# Patient Record
Sex: FEMALE | Race: OTHER | NOT HISPANIC OR LATINO
[De-identification: names, ages, dates, MRNs, and addresses within clinical notes are randomized per-mention and may not be internally consistent; named-entity substitution may affect disease eponyms.]

---

## 2019-10-07 ENCOUNTER — APPOINTMENT (OUTPATIENT)
Dept: ENDOCRINOLOGY | Facility: CLINIC | Age: 24
End: 2019-10-07
Payer: COMMERCIAL

## 2019-10-07 VITALS
WEIGHT: 125 LBS | SYSTOLIC BLOOD PRESSURE: 110 MMHG | BODY MASS INDEX: 24.54 KG/M2 | HEIGHT: 60 IN | HEART RATE: 45 BPM | DIASTOLIC BLOOD PRESSURE: 60 MMHG

## 2019-10-07 PROCEDURE — 99205 OFFICE O/P NEW HI 60 MIN: CPT | Mod: 25

## 2019-10-07 PROCEDURE — 82962 GLUCOSE BLOOD TEST: CPT | Mod: NC

## 2019-10-07 NOTE — PHYSICAL EXAM
[Well Nourished] : well nourished [EOMI] : extra ocular movement intact [Well Developed] : well developed [No Proptosis] : no proptosis [Thyroid Not Enlarged] : the thyroid was not enlarged [No Thyroid Nodules] : there were no palpable thyroid nodules [Clear to Auscultation] : lungs were clear to auscultation bilaterally [No Accessory Muscle Use] : no accessory muscle use [Normal S1, S2] : normal S1 and S2 [No Edema] : there was no peripheral edema [Regular Rhythm] : with a regular rhythm [No Clubbing, Cyanosis] : no clubbing  or cyanosis of the fingernails [Normal Strength/Tone] : muscle strength and tone were normal [No Stigmata of Cushings Syndrome] : no stigmata of cushings syndrome [No Motor Deficits] : the motor exam was normal [No Tremors] : no tremors [Normal Mood] : the mood was normal [Normal Affect] : the affect was normal [de-identified] : warm and moist

## 2019-10-07 NOTE — REVIEW OF SYSTEMS
[Muscle Cramps] : muscle cramps [Dry Skin] : dry skin [All other systems negative] : All other systems negative

## 2019-10-07 NOTE — HISTORY OF PRESENT ILLNESS
[FreeTextEntry1] : DM type:1\par Severity:uncontrolled\par Duration:3 months\par Onset:polyuria, polydipsia, weight loss, severe hyperglycemia requiring ER visit\par \par Associated symptoms or complications:variable glucose levels with hypoglycemia and hyperglycemia\par \par Modifying Factors:using dexcom G6\par \par Current regimen:\par toujeo 10 humalog colleen. 1:15 ICR, 1:100 CF. \par \par \par Current control:persistent hyperglycemia. Likely missing some meal boluses\par \par \par \par PMH:congenital heart block\par \par family history;\par mother SLE, autoimmune hepatitis

## 2019-10-07 NOTE — ASSESSMENT
[FreeTextEntry1] : DM type 1, uncontrolled. SUspect some erratic insulin use and failure to match humalog with food accurately. needs additional education and follow up, as well as review of dexcom download. Discussed option of pump therapy, which pt. is reluctant to consider but may be a future option.\par pending dexcom review continue current therapy for now. Ordered labs including islet cell antibodies and celiac panel.

## 2019-10-08 LAB — GLUCOSE BLDC GLUCOMTR-MCNC: 244

## 2019-10-31 ENCOUNTER — APPOINTMENT (OUTPATIENT)
Dept: ENDOCRINOLOGY | Facility: CLINIC | Age: 24
End: 2019-10-31

## 2019-11-13 ENCOUNTER — RX RENEWAL (OUTPATIENT)
Age: 24
End: 2019-11-13

## 2019-12-17 ENCOUNTER — APPOINTMENT (OUTPATIENT)
Dept: ENDOCRINOLOGY | Facility: CLINIC | Age: 24
End: 2019-12-17
Payer: COMMERCIAL

## 2019-12-17 ENCOUNTER — EMERGENCY (EMERGENCY)
Facility: HOSPITAL | Age: 24
LOS: 1 days | Discharge: HOME | End: 2019-12-17
Attending: EMERGENCY MEDICINE
Payer: COMMERCIAL

## 2019-12-17 VITALS
HEART RATE: 106 BPM | SYSTOLIC BLOOD PRESSURE: 121 MMHG | DIASTOLIC BLOOD PRESSURE: 86 MMHG | OXYGEN SATURATION: 99 % | RESPIRATION RATE: 20 BRPM

## 2019-12-17 VITALS
HEART RATE: 113 BPM | TEMPERATURE: 98 F | DIASTOLIC BLOOD PRESSURE: 68 MMHG | RESPIRATION RATE: 20 BRPM | SYSTOLIC BLOOD PRESSURE: 126 MMHG | OXYGEN SATURATION: 96 %

## 2019-12-17 VITALS — SYSTOLIC BLOOD PRESSURE: 158 MMHG | DIASTOLIC BLOOD PRESSURE: 92 MMHG | HEIGHT: 60 IN | HEART RATE: 132 BPM

## 2019-12-17 LAB
ACETONE SERPL-MCNC: NEGATIVE — SIGNIFICANT CHANGE UP
ALBUMIN SERPL ELPH-MCNC: 4.8 G/DL — SIGNIFICANT CHANGE UP (ref 3.3–5.2)
ALP SERPL-CCNC: 72 U/L — SIGNIFICANT CHANGE UP (ref 40–120)
ALT FLD-CCNC: 21 U/L — SIGNIFICANT CHANGE UP
ANION GAP SERPL CALC-SCNC: 18 MMOL/L — HIGH (ref 5–17)
AST SERPL-CCNC: 32 U/L — HIGH
BASOPHILS # BLD AUTO: 0.25 K/UL — HIGH (ref 0–0.2)
BASOPHILS NFR BLD AUTO: 2.6 % — HIGH (ref 0–2)
BILIRUB SERPL-MCNC: 0.2 MG/DL — LOW (ref 0.4–2)
BUN SERPL-MCNC: 8 MG/DL — SIGNIFICANT CHANGE UP (ref 8–20)
CALCIUM SERPL-MCNC: 9.3 MG/DL — SIGNIFICANT CHANGE UP (ref 8.6–10.2)
CHLORIDE SERPL-SCNC: 98 MMOL/L — SIGNIFICANT CHANGE UP (ref 98–107)
CO2 SERPL-SCNC: 24 MMOL/L — SIGNIFICANT CHANGE UP (ref 22–29)
CREAT SERPL-MCNC: 0.63 MG/DL — SIGNIFICANT CHANGE UP (ref 0.5–1.3)
EOSINOPHIL # BLD AUTO: 0.09 K/UL — SIGNIFICANT CHANGE UP (ref 0–0.5)
EOSINOPHIL NFR BLD AUTO: 0.9 % — SIGNIFICANT CHANGE UP (ref 0–6)
GLUCOSE BLDC GLUCOMTR-MCNC: 276
GLUCOSE SERPL-MCNC: 357 MG/DL — HIGH (ref 70–115)
HCG SERPL-ACNC: <4 MIU/ML — SIGNIFICANT CHANGE UP
HCT VFR BLD CALC: 44.8 % — SIGNIFICANT CHANGE UP (ref 34.5–45)
HGB BLD-MCNC: 15.7 G/DL — HIGH (ref 11.5–15.5)
LYMPHOCYTES # BLD AUTO: 4.64 K/UL — HIGH (ref 1–3.3)
LYMPHOCYTES # BLD AUTO: 47.8 % — HIGH (ref 13–44)
MANUAL SMEAR VERIFICATION: SIGNIFICANT CHANGE UP
MCHC RBC-ENTMCNC: 32.6 PG — SIGNIFICANT CHANGE UP (ref 27–34)
MCHC RBC-ENTMCNC: 35 GM/DL — SIGNIFICANT CHANGE UP (ref 32–36)
MCV RBC AUTO: 92.9 FL — SIGNIFICANT CHANGE UP (ref 80–100)
MONOCYTES # BLD AUTO: 0.34 K/UL — SIGNIFICANT CHANGE UP (ref 0–0.9)
MONOCYTES NFR BLD AUTO: 3.5 % — SIGNIFICANT CHANGE UP (ref 2–14)
NEUTROPHILS # BLD AUTO: 2.87 K/UL — SIGNIFICANT CHANGE UP (ref 1.8–7.4)
NEUTROPHILS NFR BLD AUTO: 29.6 % — LOW (ref 43–77)
PLAT MORPH BLD: NORMAL — SIGNIFICANT CHANGE UP
PLATELET # BLD AUTO: 420 K/UL — HIGH (ref 150–400)
POTASSIUM SERPL-MCNC: 4.2 MMOL/L — SIGNIFICANT CHANGE UP (ref 3.5–5.3)
POTASSIUM SERPL-SCNC: 4.2 MMOL/L — SIGNIFICANT CHANGE UP (ref 3.5–5.3)
PROT SERPL-MCNC: 7.8 G/DL — SIGNIFICANT CHANGE UP (ref 6.6–8.7)
RBC # BLD: 4.82 M/UL — SIGNIFICANT CHANGE UP (ref 3.8–5.2)
RBC # FLD: 11.5 % — SIGNIFICANT CHANGE UP (ref 10.3–14.5)
RBC BLD AUTO: NORMAL — SIGNIFICANT CHANGE UP
SODIUM SERPL-SCNC: 140 MMOL/L — SIGNIFICANT CHANGE UP (ref 135–145)
VARIANT LYMPHS # BLD: 15.6 % — HIGH (ref 0–6)
WBC # BLD: 9.71 K/UL — SIGNIFICANT CHANGE UP (ref 3.8–10.5)
WBC # FLD AUTO: 9.71 K/UL — SIGNIFICANT CHANGE UP (ref 3.8–10.5)

## 2019-12-17 PROCEDURE — 84702 CHORIONIC GONADOTROPIN TEST: CPT

## 2019-12-17 PROCEDURE — 80053 COMPREHEN METABOLIC PANEL: CPT

## 2019-12-17 PROCEDURE — 36415 COLL VENOUS BLD VENIPUNCTURE: CPT

## 2019-12-17 PROCEDURE — 99213 OFFICE O/P EST LOW 20 MIN: CPT | Mod: 25

## 2019-12-17 PROCEDURE — 82962 GLUCOSE BLOOD TEST: CPT | Mod: NC

## 2019-12-17 PROCEDURE — 82962 GLUCOSE BLOOD TEST: CPT

## 2019-12-17 PROCEDURE — 99284 EMERGENCY DEPT VISIT MOD MDM: CPT

## 2019-12-17 PROCEDURE — 99284 EMERGENCY DEPT VISIT MOD MDM: CPT | Mod: 25

## 2019-12-17 PROCEDURE — 85027 COMPLETE CBC AUTOMATED: CPT

## 2019-12-17 PROCEDURE — 82009 KETONE BODYS QUAL: CPT

## 2019-12-17 RX ORDER — SODIUM CHLORIDE 9 MG/ML
1000 INJECTION INTRAMUSCULAR; INTRAVENOUS; SUBCUTANEOUS ONCE
Refills: 0 | Status: COMPLETED | OUTPATIENT
Start: 2019-12-17 | End: 2019-12-17

## 2019-12-17 RX ORDER — ONDANSETRON 8 MG/1
4 TABLET, FILM COATED ORAL ONCE
Refills: 0 | Status: COMPLETED | OUTPATIENT
Start: 2019-12-17 | End: 2019-12-17

## 2019-12-17 RX ORDER — INSULIN HUMAN 100 [IU]/ML
4 INJECTION, SOLUTION SUBCUTANEOUS ONCE
Refills: 0 | Status: COMPLETED | OUTPATIENT
Start: 2019-12-17 | End: 2019-12-17

## 2019-12-17 RX ORDER — INSULIN HUMAN 100 [IU]/ML
3 INJECTION, SOLUTION SUBCUTANEOUS ONCE
Refills: 0 | Status: DISCONTINUED | OUTPATIENT
Start: 2019-12-17 | End: 2019-12-28

## 2019-12-17 RX ORDER — INSULIN GLARGINE 100 [IU]/ML
10 INJECTION, SOLUTION SUBCUTANEOUS
Qty: 300 | Refills: 0
Start: 2019-12-17 | End: 2020-01-15

## 2019-12-17 RX ADMIN — SODIUM CHLORIDE 1000 MILLILITER(S): 9 INJECTION INTRAMUSCULAR; INTRAVENOUS; SUBCUTANEOUS at 18:26

## 2019-12-17 NOTE — ASSESSMENT
[FreeTextEntry1] : 25 y/o with Type 1 DM.\par \par EMS called to bring pt. to [facility] due to tachycardia and hypertensive. Report given to EMS. \par Dr. Coates on-call provider made aware of current situation.  Pt. is to follow up s/p hospitalization.

## 2019-12-17 NOTE — ED ADULT NURSE NOTE - PMH
Alcohol abuse    Type 2 diabetes mellitus without complication, with long-term current use of insulin

## 2019-12-17 NOTE — HISTORY OF PRESENT ILLNESS
[FreeTextEntry1] : Type 1 DM: Per pt. she was recently released from a hospital in [place] r/t alcoholism. \par \par Pt. came into the office asking for a refill of Toujeo and stated that she has not been taking her insulin for 3 days due to no refills available. She stated that she has been drinking alcohol today and the last time she drank was 30 minutes ago. Denies drug use.  \par

## 2019-12-17 NOTE — PHYSICAL EXAM
[Alert] : alert [Oriented x3] : oriented to person, place, and time [de-identified] : red eyes  [de-identified] : tachycardia  [de-identified] : unsteady gait  [de-identified] : impaired insight

## 2019-12-17 NOTE — ED PROVIDER NOTE - OBJECTIVE STATEMENT
Pt. present to ED stating "I have not taken insulin in 2days". Pt. is out of her Insulin.  Pt. c/o nausea today. NO vomiting. Pt. with polydipsia and polyuria. Pt. has been on Insulin for the past 6 months. Pt. admits to drinking alcohol. Pt. admits to also smoking weed. NO chest pain. No fever. NO abdominal pain.

## 2019-12-17 NOTE — ED ADULT NURSE REASSESSMENT NOTE - NS ED NURSE REASSESS COMMENT FT1
Pt received at 1959. Chart as noted. Pt is A&OX3. Pt denies pain, N/V/D. Pt VSS, NSR on cardiac monitor. Pt in NAD at this time. Pt moves all extremities equal and bilateral. Plan of care and wait time explained. Pt awaiting tolerating PO intake and sister arrival.  Safety maintained.

## 2019-12-17 NOTE — ED ADULT NURSE REASSESSMENT NOTE - NS ED NURSE REASSESS COMMENT FT1
pt reports drinking daily " for a few years"  pt reports last drink 3pm today - " I drink like 2 pints a day"  pt states she "was in [place] and I got arrested then I was at the hospital but when I left they took my diabetes medication and didn't give it back when I left"

## 2019-12-17 NOTE — ED PROVIDER NOTE - PATIENT PORTAL LINK FT
You can access the FollowMyHealth Patient Portal offered by [facility] by registering at the following website: [email or web address]. By joining [facility]’s FollowMyHealth portal, you will also be able to view your health information using other applications (apps) compatible with our system.

## 2019-12-17 NOTE — ED ADULT NURSE NOTE - NSIMPLEMENTINTERV_GEN_ALL_ED
Implemented All Fall with Harm Risk Interventions:  [place] to call system. Call bell, personal items and telephone within reach. Instruct patient to call for assistance. Room bathroom lighting operational. Non-slip footwear when patient is off stretcher. Physically safe environment: no spills, clutter or unnecessary equipment. Stretcher in lowest position, wheels locked, appropriate side rails in place. Provide visual cue, wrist band, yellow gown, etc. Monitor gait and stability. Monitor for mental status changes and reorient to person, place, and time. Review medications for side effects contributing to fall risk. Reinforce activity limits and safety measures with patient and family. Provide visual clues: red socks.

## 2019-12-17 NOTE — ED PROVIDER NOTE - PROGRESS NOTE DETAILS
Pt. pulled out IV. Pt. refused the zofran. Serum Acetone is negative. Pt. not in DKA.  Pt. in no distress. Pt. lying in stretcher comfortably. Will ask pt. to contact family member for . Pt. now states that she take Humalog with meals and Toujeo as her long acting Insulin. Pt. has humalog but not her Toujeo. Pt. with improving vital signs. Pt. awake and alert. Normal and steady gait. prescription sent to patient's pharmacy. Pt's sister here to pick her up. Pt. tolerating PO. NO vomiting. No abdominal pain. Pt. stable for discharge.

## 2019-12-18 RX ORDER — INSULIN GLARGINE 100 [IU]/ML
10 INJECTION, SOLUTION SUBCUTANEOUS
Qty: 1 | Refills: 0
Start: 2019-12-18 | End: 2020-01-16

## 2020-03-10 ENCOUNTER — APPOINTMENT (OUTPATIENT)
Dept: ENDOCRINOLOGY | Facility: CLINIC | Age: 25
End: 2020-03-10

## 2020-03-10 PROBLEM — F10.10 ALCOHOL ABUSE, UNCOMPLICATED: Chronic | Status: ACTIVE | Noted: 2019-12-17

## 2020-03-10 PROBLEM — E11.9 TYPE 2 DIABETES MELLITUS WITHOUT COMPLICATIONS: Chronic | Status: ACTIVE | Noted: 2019-12-17

## 2020-05-22 ENCOUNTER — RX RENEWAL (OUTPATIENT)
Age: 25
End: 2020-05-22

## 2020-06-23 RX ORDER — FLASH GLUCOSE SENSOR
KIT MISCELLANEOUS
Qty: 1 | Refills: 0 | Status: ACTIVE | COMMUNITY
Start: 2019-11-13 | End: 1900-01-01

## 2020-06-23 RX ORDER — FLASH GLUCOSE SENSOR
KIT MISCELLANEOUS
Qty: 1 | Refills: 0 | Status: ACTIVE | COMMUNITY
Start: 2020-06-22 | End: 1900-01-01

## 2020-08-21 ENCOUNTER — APPOINTMENT (OUTPATIENT)
Dept: ENDOCRINOLOGY | Facility: CLINIC | Age: 25
End: 2020-08-21
Payer: COMMERCIAL

## 2020-08-21 VITALS
HEART RATE: 117 BPM | HEIGHT: 61 IN | DIASTOLIC BLOOD PRESSURE: 88 MMHG | SYSTOLIC BLOOD PRESSURE: 122 MMHG | OXYGEN SATURATION: 96 % | WEIGHT: 145 LBS | BODY MASS INDEX: 27.38 KG/M2

## 2020-08-21 PROCEDURE — 99214 OFFICE O/P EST MOD 30 MIN: CPT | Mod: 25

## 2020-08-21 PROCEDURE — 82962 GLUCOSE BLOOD TEST: CPT

## 2020-08-21 NOTE — REVIEW OF SYSTEMS
[Recent Weight Gain (___ Lbs)] : recent weight gain: [unfilled] lbs [Blurred Vision] : blurred vision [Fatigue] : no fatigue [Decreased Appetite] : appetite not decreased [Visual Field Defect] : no visual field defect [Neck Pain] : no neck pain [Dysphagia] : no dysphagia [Dysphonia] : no dysphonia [Palpitations] : no palpitations [Chest Pain] : no chest pain [Diarrhea] : no diarrhea [Constipation] : no constipation [Dysuria] : no dysuria [Polyuria] : no polyuria [Tremors] : no tremors [Headaches] : no headaches [Depression] : no depression [Polydipsia] : no polydipsia [Anxiety] : no anxiety [Cold Intolerance] : no cold intolerance [Heat Intolerance] : no heat intolerance [Swelling] : no swelling [FreeTextEntry3] : with high blood sugar

## 2020-08-21 NOTE — ASSESSMENT
[FreeTextEntry1] : 24 y/o female with uncontrolled Type 1 DM. \par \par - check A1C now\par - placed phuong personal sensor in office \par - start using Phuong reader again \par - continue current Rx\par - educated on the importance of annual retinopathy screening - information provided\par - schedule appointment with CDE in 2 weeks for carb counting\par - continue to follow up at [facility] for alcoholism treatment  \par - follow up visit in 6 weeks \par  [Retinopathy Screening] : Patient was referred to ophthalmology for retinopathy screening

## 2020-08-21 NOTE — PHYSICAL EXAM
[Alert] : alert [Well Developed] : well developed [No Acute Distress] : no acute distress [Well Nourished] : well nourished [No LAD] : no lymphadenopathy [EOMI] : extra ocular movement intact [Normal Sclera/Conjunctiva] : normal sclera/conjunctiva [Thyroid Not Enlarged] : the thyroid was not enlarged [No Thyroid Nodules] : no palpable thyroid nodules [No Respiratory Distress] : no respiratory distress [Normal Rate and Effort] : normal respiratory rate and effort [Normal S1, S2] : normal S1 and S2 [Clear to Auscultation] : lungs were clear to auscultation bilaterally [Regular Rhythm] : with a regular rhythm [No Edema] : no peripheral edema [Normal Rate] : heart rate was normal [Pedal Pulses Normal] : the pedal pulses are present [Normal Bowel Sounds] : normal bowel sounds [Soft] : abdomen soft [Not Tender] : non-tender [Acanthosis Nigricans] : no acanthosis nigricans [No Rash] : no rash [Foot Ulcers] : no foot ulcers [Right Foot Was Examined] : right foot ~C was examined [Left Foot Was Examined] : left foot ~C was examined [Normal] : normal [Oriented x3] : oriented to person, place, and time [No Tremors] : no tremors [Normal Mood] : the mood was normal [Normal Insight/Judgement] : insight and judgment were intact

## 2020-08-21 NOTE — HISTORY OF PRESENT ILLNESS
[FreeTextEntry1] : Pt. reports that she is in recovery for alcoholism. She attends [employer]. Currently she has 2 alcoholic drinks a day. \par \par Quality: Type 1 DM \par Severity: uncontrolled \par Duration: over 1 year ago\par Onset: weight loss, BG > 500 \par Modifying Factors: Better with insulin \par Associated Symptoms: denies micro/macrovascular \par \par Current Regimen: Pt. is fearful of having low blood sugars. \par Humalog 3 units before meals\par Toujeo 10 units at am \par \par Self blood sugar monitoring: rarely. She was using the Phuong in the past. \par Current BG  291\par \par exercise: none\par \par Diet: 2 alcohol drinks a day\par - fried foods, salads \par \par \par Date of last eye exam: years ago \par Date of last foot exam: none \par Date of last flu vaccine: no \par Date of last Pneumovax: no\par

## 2020-08-26 LAB
25(OH)D3 SERPL-MCNC: 33.1 NG/ML
ALBUMIN SERPL ELPH-MCNC: 4.6 G/DL
ALP BLD-CCNC: 72 U/L
ALT SERPL-CCNC: 101 U/L
ANION GAP SERPL CALC-SCNC: 19 MMOL/L
AST SERPL-CCNC: 98 U/L
BASOPHILS # BLD AUTO: 0.11 K/UL
BASOPHILS NFR BLD AUTO: 1.6 %
BILIRUB SERPL-MCNC: 0.2 MG/DL
BUN SERPL-MCNC: 11 MG/DL
CALCIUM SERPL-MCNC: 9.3 MG/DL
CHLORIDE SERPL-SCNC: 97 MMOL/L
CHOLEST SERPL-MCNC: 186 MG/DL
CHOLEST/HDLC SERPL: 2.5 RATIO
CO2 SERPL-SCNC: 24 MMOL/L
CREAT SERPL-MCNC: 0.55 MG/DL
CREAT SPEC-SCNC: 81 MG/DL
EOSINOPHIL # BLD AUTO: 0.05 K/UL
EOSINOPHIL NFR BLD AUTO: 0.7 %
ESTIMATED AVERAGE GLUCOSE: 214 MG/DL
GLUCOSE SERPL-MCNC: 299 MG/DL
HBA1C MFR BLD HPLC: 9.1 %
HCT VFR BLD CALC: 42.8 %
HDLC SERPL-MCNC: 74 MG/DL
HGB BLD-MCNC: 14.6 G/DL
IMM GRANULOCYTES NFR BLD AUTO: 0.3 %
LDLC SERPL CALC-MCNC: 54 MG/DL
LYMPHOCYTES # BLD AUTO: 2.11 K/UL
LYMPHOCYTES NFR BLD AUTO: 30.4 %
MAN DIFF?: NORMAL
MCHC RBC-ENTMCNC: 33.7 PG
MCHC RBC-ENTMCNC: 34.1 GM/DL
MCV RBC AUTO: 98.8 FL
MICROALBUMIN 24H UR DL<=1MG/L-MCNC: <1.2 MG/DL
MICROALBUMIN/CREAT 24H UR-RTO: NORMAL MG/G
MONOCYTES # BLD AUTO: 0.56 K/UL
MONOCYTES NFR BLD AUTO: 8.1 %
NEUTROPHILS # BLD AUTO: 4.1 K/UL
NEUTROPHILS NFR BLD AUTO: 58.9 %
PLATELET # BLD AUTO: 305 K/UL
POTASSIUM SERPL-SCNC: 4.2 MMOL/L
PROT SERPL-MCNC: 6.7 G/DL
RBC # BLD: 4.33 M/UL
RBC # FLD: 11.6 %
SODIUM SERPL-SCNC: 139 MMOL/L
T4 FREE SERPL-MCNC: 1.1 NG/DL
TRIGL SERPL-MCNC: 289 MG/DL
TSH SERPL-ACNC: 2.17 UIU/ML
VIT B12 SERPL-MCNC: 821 PG/ML
WBC # FLD AUTO: 6.95 K/UL

## 2020-10-23 ENCOUNTER — APPOINTMENT (OUTPATIENT)
Dept: ENDOCRINOLOGY | Facility: CLINIC | Age: 25
End: 2020-10-23

## 2021-01-24 ENCOUNTER — RX RENEWAL (OUTPATIENT)
Age: 26
End: 2021-01-24

## 2021-02-19 ENCOUNTER — APPOINTMENT (OUTPATIENT)
Dept: ENDOCRINOLOGY | Facility: CLINIC | Age: 26
End: 2021-02-19
Payer: COMMERCIAL

## 2021-02-19 VITALS
TEMPERATURE: 97 F | DIASTOLIC BLOOD PRESSURE: 80 MMHG | WEIGHT: 145 LBS | OXYGEN SATURATION: 97 % | HEIGHT: 61 IN | SYSTOLIC BLOOD PRESSURE: 120 MMHG | BODY MASS INDEX: 27.38 KG/M2 | HEART RATE: 111 BPM

## 2021-02-19 LAB — GLUCOSE BLDC GLUCOMTR-MCNC: 255

## 2021-02-19 PROCEDURE — 82962 GLUCOSE BLOOD TEST: CPT

## 2021-02-19 PROCEDURE — 99072 ADDL SUPL MATRL&STAF TM PHE: CPT

## 2021-02-19 PROCEDURE — 95251 CONT GLUC MNTR ANALYSIS I&R: CPT

## 2021-02-19 PROCEDURE — 99214 OFFICE O/P EST MOD 30 MIN: CPT | Mod: 25

## 2021-02-19 NOTE — PHYSICAL EXAM
[Alert] : alert [Well Nourished] : well nourished [No Acute Distress] : no acute distress [Well Developed] : well developed [Normal Sclera/Conjunctiva] : normal sclera/conjunctiva [EOMI] : extra ocular movement intact [No LAD] : no lymphadenopathy [Thyroid Not Enlarged] : the thyroid was not enlarged [No Thyroid Nodules] : no palpable thyroid nodules [No Respiratory Distress] : no respiratory distress [Normal Rate and Effort] : normal respiratory rate and effort [Clear to Auscultation] : lungs were clear to auscultation bilaterally [Normal S1, S2] : normal S1 and S2 [Normal Rate] : heart rate was normal [Regular Rhythm] : with a regular rhythm [No Edema] : no peripheral edema [Pedal Pulses Normal] : the pedal pulses are present [Normal Bowel Sounds] : normal bowel sounds [Not Tender] : non-tender [Soft] : abdomen soft [No Rash] : no rash [Acanthosis Nigricans] : no acanthosis nigricans [Foot Ulcers] : no foot ulcers [Right Foot Was Examined] : right foot ~C was examined [Left Foot Was Examined] : left foot ~C was examined [Normal] : normal [Diminished Throughout Both Feet] : normal tactile sensation with monofilament testing throughout both feet [No Tremors] : no tremors [Oriented x3] : oriented to person, place, and time [Normal Affect] : the affect was normal [Normal Insight/Judgement] : insight and judgment were intact [Normal Mood] : the mood was normal

## 2021-02-19 NOTE — HISTORY OF PRESENT ILLNESS
[FreeTextEntry1] : Pt. reports that she is in recovery for alcoholism. She attends [facility] for substance abuse. Currently she has 1 alcoholic drinks a day. Pt. reports that use of daily marijuana use. \par \par Quality: Type 1 DM \par Severity: uncontrolled \par Duration: over 1 year ago\par Onset: weight loss, BG > 500 \par Modifying Factors: Better with insulin \par Associated Symptoms: denies micro/macrovascular \par \par Current Regimen: Pt. is fearful of having low blood sugars. \par Humalog 3-6 units before meals\par Toujeo 12 units at am \par \par Self blood sugar monitoring: Phuong Freyle senor results:  Average Glucose 289, 97% over 180, 3% in target range, 0% less than 70\par limited data to not scanning sensor consistently \par Current BG  255\par \par exercise: none\par \par Diet: 1 alcohol drink a day\par - fried foods, salads \par \par \par Date of last eye exam: years ago \par Date of last foot exam: none \par Date of last flu vaccine: no \par Date of last Pneumovax: no

## 2021-02-19 NOTE — REVIEW OF SYSTEMS
[Recent Weight Gain (___ Lbs)] : recent weight gain: [unfilled] lbs [Blurred Vision] : blurred vision [Neck Pain] : neck pain [Fatigue] : no fatigue [Decreased Appetite] : appetite not decreased [Visual Field Defect] : no visual field defect [Dysphagia] : no dysphagia [Chest Pain] : no chest pain [Dysphonia] : no dysphonia [Palpitations] : no palpitations [Constipation] : no constipation [Diarrhea] : no diarrhea [Polyuria] : no polyuria [Dysuria] : no dysuria [Headaches] : no headaches [Tremors] : no tremors [Depression] : no depression [Anxiety] : no anxiety [Polydipsia] : no polydipsia [Swelling] : no swelling [de-identified] : dry mouth

## 2021-02-24 LAB
25(OH)D3 SERPL-MCNC: 16.5 NG/ML
ALBUMIN SERPL ELPH-MCNC: 4.8 G/DL
ALP BLD-CCNC: 65 U/L
ALT SERPL-CCNC: 130 U/L
ANION GAP SERPL CALC-SCNC: 21 MMOL/L
AST SERPL-CCNC: 168 U/L
BASOPHILS # BLD AUTO: 0.15 K/UL
BASOPHILS NFR BLD AUTO: 1.8 %
BILIRUB SERPL-MCNC: 0.3 MG/DL
BUN SERPL-MCNC: 9 MG/DL
CALCIUM SERPL-MCNC: 9.7 MG/DL
CHLORIDE SERPL-SCNC: 93 MMOL/L
CHOLEST SERPL-MCNC: 245 MG/DL
CO2 SERPL-SCNC: 22 MMOL/L
CREAT SERPL-MCNC: 0.54 MG/DL
CREAT SPEC-SCNC: 7 MG/DL
EOSINOPHIL # BLD AUTO: 0.06 K/UL
EOSINOPHIL NFR BLD AUTO: 0.7 %
ESTIMATED AVERAGE GLUCOSE: 249 MG/DL
GLUCOSE SERPL-MCNC: 238 MG/DL
HBA1C MFR BLD HPLC: 10.3 %
HCT VFR BLD CALC: 45.6 %
HDLC SERPL-MCNC: 99 MG/DL
HGB BLD-MCNC: 15.7 G/DL
IMM GRANULOCYTES NFR BLD AUTO: 0.4 %
LDLC SERPL CALC-MCNC: 126 MG/DL
LYMPHOCYTES # BLD AUTO: 2.42 K/UL
LYMPHOCYTES NFR BLD AUTO: 29 %
MAN DIFF?: NORMAL
MCHC RBC-ENTMCNC: 34.4 GM/DL
MCHC RBC-ENTMCNC: 34.4 PG
MCV RBC AUTO: 99.8 FL
MICROALBUMIN 24H UR DL<=1MG/L-MCNC: <1.2 MG/DL
MICROALBUMIN/CREAT 24H UR-RTO: NORMAL MG/G
MONOCYTES # BLD AUTO: 0.64 K/UL
MONOCYTES NFR BLD AUTO: 7.7 %
NEUTROPHILS # BLD AUTO: 5.04 K/UL
NEUTROPHILS NFR BLD AUTO: 60.4 %
NONHDLC SERPL-MCNC: 146 MG/DL
PLATELET # BLD AUTO: 300 K/UL
POTASSIUM SERPL-SCNC: 4.6 MMOL/L
PROT SERPL-MCNC: 7.6 G/DL
RBC # BLD: 4.57 M/UL
RBC # FLD: 11.1 %
SODIUM SERPL-SCNC: 135 MMOL/L
T4 FREE SERPL-MCNC: 1.1 NG/DL
TRIGL SERPL-MCNC: 98 MG/DL
TSH SERPL-ACNC: 1.1 UIU/ML
VIT B12 SERPL-MCNC: 945 PG/ML
WBC # FLD AUTO: 8.34 K/UL

## 2021-02-24 RX ORDER — ERGOCALCIFEROL 1.25 MG/1
1.25 MG CAPSULE, LIQUID FILLED ORAL
Qty: 4 | Refills: 5 | Status: ACTIVE | COMMUNITY
Start: 2021-02-23 | End: 1900-01-01

## 2021-03-11 ENCOUNTER — APPOINTMENT (OUTPATIENT)
Dept: ENDOCRINOLOGY | Facility: CLINIC | Age: 26
End: 2021-03-11

## 2021-04-01 ENCOUNTER — APPOINTMENT (OUTPATIENT)
Dept: ENDOCRINOLOGY | Facility: CLINIC | Age: 26
End: 2021-04-01

## 2021-04-20 ENCOUNTER — APPOINTMENT (OUTPATIENT)
Dept: ENDOCRINOLOGY | Facility: CLINIC | Age: 26
End: 2021-04-20
Payer: COMMERCIAL

## 2021-04-20 PROCEDURE — 99072 ADDL SUPL MATRL&STAF TM PHE: CPT

## 2021-04-20 PROCEDURE — 99214 OFFICE O/P EST MOD 30 MIN: CPT | Mod: 95

## 2021-04-20 PROCEDURE — G0108 DIAB MANAGE TRN  PER INDIV: CPT

## 2021-04-20 RX ORDER — FLASH GLUCOSE SENSOR
KIT MISCELLANEOUS
Qty: 6 | Refills: 3 | Status: ACTIVE | COMMUNITY
Start: 2020-06-24 | End: 1900-01-01

## 2021-04-20 NOTE — HISTORY OF PRESENT ILLNESS
[Home] : at home, [unfilled] , at the time of the visit. [Other Location: e.g. Home (Enter Location, City,State)___] : at [unfilled] [Verbal consent obtained from patient] : the patient, [unfilled] [FreeTextEntry1] : Pt. reports that she is in recovery for alcoholism. She attends [facility] for substance abuse. Currently she has 1 alcoholic drinks a day. Pt. reports that use of daily marijuana use. \par \par Interval History: Recently diagnosis celiac disease, had endoscopy with biopsy of intestine, no results as of yet. \par \par Quality: Type 1 DM \par Severity: uncontrolled \par Duration: over 1 year ago\par Onset: weight loss, BG > 500 \par Modifying Factors: Better with insulin \par Associated Symptoms: denies micro/macrovascular \par \par Current Regimen: Pt. is fearful of having low blood sugars. \par Humalog 5 units before meals\par Toujeo 14 units at am \par \par Self blood sugar monitoring: Phuong haskins results:  per patient Average Glucose 135 \par limited data to not scanning sensor consistently \par scanned \par recent blood sugar of 80 corrected with pure suagr \par \par exercise: walking \par \par Diet: 1 alcohol drink a day\par - changing to gluten free diet \par \par \par Date of last eye exam: years ago \par Date of last foot exam: none \par Date of last flu vaccine: no \par Date of last Pneumovax: no

## 2021-04-20 NOTE — REVIEW OF SYSTEMS
[Fatigue] : no fatigue [Decreased Appetite] : decreased appetite [Recent Weight Gain (___ Lbs)] : no recent weight gain [Recent Weight Loss (___ Lbs)] : recent weight loss: [unfilled] lbs [Visual Field Defect] : no visual field defect [Blurred Vision] : no blurred vision [Dysphagia] : no dysphagia [Neck Pain] : no neck pain [Dysphonia] : no dysphonia [Chest Pain] : no chest pain [Palpitations] : palpitations [Vomiting] : vomiting [Constipation] : no constipation [Diarrhea] : no diarrhea [Polyuria] : no polyuria [Dysuria] : no dysuria [Headaches] : no headaches [Tremors] : no tremors [Polydipsia] : no polydipsia [de-identified] : dry mouth

## 2021-04-20 NOTE — ASSESSMENT
[FreeTextEntry1] : 25 y/o female with uncontrolled Type 1 DM. \par \par Plan: \par - continue current Rx for now, until phuong is downloaded today \par - continue Phuong sensor\par - discussed the damaging effects to the body with high blood sugar \par - educated on the importance of annual retinopathy screening - information provided\par - keep scheduled appointment with CDE to review gluten free diet and download Phuong sensor \par - continue to follow up at [facility] for alcoholism treatment  \par - follow up with Cardiology due to palpitations and history of second degree heart block \par - Glucose Sensor Necessity: This patient with diabetes performs 4 or more glucose checks per day utilizing a continuous blood glucose monitor. The patient is treated with insulin via 3 or more injections daily. This patient requires frequent adjustments to their insulin treatment on the basis of therapeutic continuous glucose monitoring results.\par \par RTO in 4 weeks \par \par Start Time: 9:00\par End Time: 9:31

## 2021-04-22 ENCOUNTER — NON-APPOINTMENT (OUTPATIENT)
Age: 26
End: 2021-04-22

## 2021-04-22 DIAGNOSIS — E10.65 TYPE 1 DIABETES MELLITUS WITH HYPERGLYCEMIA: ICD-10-CM

## 2021-04-25 ENCOUNTER — RX RENEWAL (OUTPATIENT)
Age: 26
End: 2021-04-25

## 2021-05-03 ENCOUNTER — APPOINTMENT (OUTPATIENT)
Dept: CARDIOLOGY | Facility: CLINIC | Age: 26
End: 2021-05-03

## 2021-07-09 ENCOUNTER — RX RENEWAL (OUTPATIENT)
Age: 26
End: 2021-07-09

## 2021-07-30 NOTE — ED ADULT TRIAGE NOTE - NS ED NURSE AMBULANCES
Patient mother Tyra called requesting a right shoulder x ray.    She has a follow up appointment next Wednesday and is having a x ray for scoliosis  And would like her right shoulder x rayed since it has never been done for her shoulder weakness just to make sure nothing has been missed.    Please place order if you feel appropriate.    Onlly call mom back if further questions .   [facility]

## 2021-08-25 ENCOUNTER — APPOINTMENT (OUTPATIENT)
Dept: ENDOCRINOLOGY | Facility: CLINIC | Age: 26
End: 2021-08-25

## 2021-09-24 ENCOUNTER — RX RENEWAL (OUTPATIENT)
Age: 26
End: 2021-09-24

## 2021-10-29 ENCOUNTER — APPOINTMENT (OUTPATIENT)
Dept: ENDOCRINOLOGY | Facility: CLINIC | Age: 26
End: 2021-10-29

## 2021-12-22 ENCOUNTER — RX RENEWAL (OUTPATIENT)
Age: 26
End: 2021-12-22

## 2021-12-22 RX ORDER — PEN NEEDLE, DIABETIC 29 G X1/2"
32G X 4 MM NEEDLE, DISPOSABLE MISCELLANEOUS
Qty: 360 | Refills: 0 | Status: ACTIVE | COMMUNITY
Start: 2020-08-21 | End: 1900-01-01

## 2022-01-10 RX ORDER — FLASH GLUCOSE SCANNING READER
EACH MISCELLANEOUS
Qty: 1 | Refills: 0 | Status: ACTIVE | COMMUNITY
Start: 2022-01-10 | End: 1900-01-01

## 2022-01-28 RX ORDER — INSULIN LISPRO 100 [IU]/ML
100 INJECTION, SOLUTION SUBCUTANEOUS
Qty: 1 | Refills: 0 | Status: ACTIVE | COMMUNITY
Start: 2019-10-07 | End: 1900-01-01

## 2022-01-28 RX ORDER — INSULIN GLARGINE 300 U/ML
300 INJECTION, SOLUTION SUBCUTANEOUS
Qty: 1 | Refills: 0 | Status: ACTIVE | COMMUNITY
Start: 2019-10-07 | End: 1900-01-01

## 2022-02-15 NOTE — ED ADULT TRIAGE NOTE - PAIN: PRESENCE, MLM
Detail Level: Detailed Quality 226: Preventive Care And Screening: Tobacco Use: Screening And Cessation Intervention: Patient screened for tobacco use, is a smoker AND received Cessation Counseling Quality 130: Documentation Of Current Medications In The Medical Record: Current Medications Documented Quality 402: Tobacco Use And Help With Quitting Among Adolescents: Patient screened for tobacco and is a smoker AND received Cessation Counseling Quality 431: Preventive Care And Screening: Unhealthy Alcohol Use - Screening: Patient not identified as an unhealthy alcohol user when screened for unhealthy alcohol use using a systematic screening method denies pain/discomfort

## 2022-02-18 ENCOUNTER — NON-APPOINTMENT (OUTPATIENT)
Age: 27
End: 2022-02-18

## 2022-02-24 ENCOUNTER — APPOINTMENT (OUTPATIENT)
Dept: ENDOCRINOLOGY | Facility: CLINIC | Age: 27
End: 2022-02-24

## 2022-08-24 NOTE — ED ADULT TRIAGE NOTE - TEMPERATURE IN FAHRENHEIT (DEGREES F)
98.4 Zyclara Counseling:  I discussed with the patient the risks of imiquimod including but not limited to erythema, scaling, itching, weeping, crusting, and pain.  Patient understands that the inflammatory response to imiquimod is variable from person to person and was educated regarded proper titration schedule.  If flu-like symptoms develop, patient knows to discontinue the medication and contact us.

## 2022-11-01 NOTE — ED ADULT NURSE NOTE - CHPI ED NUR SYMPTOMS POS

## 2023-01-18 NOTE — ED ADULT NURSE NOTE - MODE OF DISCHARGE
Ambulatory Arava Counseling:  Patient counseled regarding adverse effects of Arava including but not limited to nausea, vomiting, abnormalities in liver function tests. Patients may develop mouth sores, rash, diarrhea, and abnormalities in blood counts. The patient understands that monitoring is required including LFTs and blood counts.  There is a rare possibility of scarring of the liver and lung problems that can occur when taking methotrexate. Persistent nausea, loss of appetite, pale stools, dark urine, cough, and shortness of breath should be reported immediately. Patient advised to discontinue Arava treatment and consult with a physician prior to attempting conception. The patient will have to undergo a treatment to eliminate Arava from the body prior to conception.

## 2023-12-20 ENCOUNTER — INPATIENT (INPATIENT)
Facility: HOSPITAL | Age: 28
LOS: 3 days | Discharge: HOME | DRG: 896 | End: 2023-12-24
Attending: HOSPITALIST | Admitting: STUDENT IN AN ORGANIZED HEALTH CARE EDUCATION/TRAINING PROGRAM
Payer: SELF-PAY

## 2023-12-20 VITALS
DIASTOLIC BLOOD PRESSURE: 82 MMHG | RESPIRATION RATE: 17 BRPM | WEIGHT: 139.99 LBS | OXYGEN SATURATION: 100 % | SYSTOLIC BLOOD PRESSURE: 119 MMHG | TEMPERATURE: 98 F | HEART RATE: 100 BPM

## 2023-12-20 LAB
ALBUMIN SERPL ELPH-MCNC: 2.8 G/DL — LOW (ref 3.3–5.2)
ALBUMIN SERPL ELPH-MCNC: 2.8 G/DL — LOW (ref 3.3–5.2)
ALP SERPL-CCNC: 124 U/L — HIGH (ref 40–120)
ALP SERPL-CCNC: 124 U/L — HIGH (ref 40–120)
ALT FLD-CCNC: SIGNIFICANT CHANGE UP U/L
ALT FLD-CCNC: SIGNIFICANT CHANGE UP U/L
ANION GAP SERPL CALC-SCNC: 14 MMOL/L — SIGNIFICANT CHANGE UP (ref 5–17)
ANION GAP SERPL CALC-SCNC: 14 MMOL/L — SIGNIFICANT CHANGE UP (ref 5–17)
APAP SERPL-MCNC: <3 UG/ML — LOW (ref 10–26)
APAP SERPL-MCNC: <3 UG/ML — LOW (ref 10–26)
AST SERPL-CCNC: SIGNIFICANT CHANGE UP U/L
AST SERPL-CCNC: SIGNIFICANT CHANGE UP U/L
BASE EXCESS BLDV CALC-SCNC: 6.6 MMOL/L — HIGH (ref -2–3)
BASE EXCESS BLDV CALC-SCNC: 6.6 MMOL/L — HIGH (ref -2–3)
BASOPHILS # BLD AUTO: 0.06 K/UL — SIGNIFICANT CHANGE UP (ref 0–0.2)
BASOPHILS # BLD AUTO: 0.06 K/UL — SIGNIFICANT CHANGE UP (ref 0–0.2)
BASOPHILS NFR BLD AUTO: 1.1 % — SIGNIFICANT CHANGE UP (ref 0–2)
BASOPHILS NFR BLD AUTO: 1.1 % — SIGNIFICANT CHANGE UP (ref 0–2)
BILIRUB SERPL-MCNC: 1.4 MG/DL — SIGNIFICANT CHANGE UP (ref 0.4–2)
BILIRUB SERPL-MCNC: 1.4 MG/DL — SIGNIFICANT CHANGE UP (ref 0.4–2)
BUN SERPL-MCNC: 15 MG/DL — SIGNIFICANT CHANGE UP (ref 8–20)
BUN SERPL-MCNC: 15 MG/DL — SIGNIFICANT CHANGE UP (ref 8–20)
CA-I SERPL-SCNC: 0.98 MMOL/L — LOW (ref 1.15–1.33)
CA-I SERPL-SCNC: 0.98 MMOL/L — LOW (ref 1.15–1.33)
CALCIUM SERPL-MCNC: 8.3 MG/DL — LOW (ref 8.4–10.5)
CALCIUM SERPL-MCNC: 8.3 MG/DL — LOW (ref 8.4–10.5)
CHLORIDE BLDV-SCNC: 92 MMOL/L — LOW (ref 96–108)
CHLORIDE BLDV-SCNC: 92 MMOL/L — LOW (ref 96–108)
CHLORIDE SERPL-SCNC: 78 MMOL/L — LOW (ref 96–108)
CHLORIDE SERPL-SCNC: 78 MMOL/L — LOW (ref 96–108)
CK MB CFR SERPL CALC: 2.7 NG/ML — SIGNIFICANT CHANGE UP (ref 0–6.7)
CK MB CFR SERPL CALC: 2.7 NG/ML — SIGNIFICANT CHANGE UP (ref 0–6.7)
CK SERPL-CCNC: 173 U/L — HIGH (ref 25–170)
CK SERPL-CCNC: 173 U/L — HIGH (ref 25–170)
CO2 SERPL-SCNC: 26 MMOL/L — SIGNIFICANT CHANGE UP (ref 22–29)
CO2 SERPL-SCNC: 26 MMOL/L — SIGNIFICANT CHANGE UP (ref 22–29)
CREAT SERPL-MCNC: 0.65 MG/DL — SIGNIFICANT CHANGE UP (ref 0.5–1.3)
CREAT SERPL-MCNC: 0.65 MG/DL — SIGNIFICANT CHANGE UP (ref 0.5–1.3)
EGFR: 124 ML/MIN/1.73M2 — SIGNIFICANT CHANGE UP
EGFR: 124 ML/MIN/1.73M2 — SIGNIFICANT CHANGE UP
EOSINOPHIL # BLD AUTO: 0.01 K/UL — SIGNIFICANT CHANGE UP (ref 0–0.5)
EOSINOPHIL # BLD AUTO: 0.01 K/UL — SIGNIFICANT CHANGE UP (ref 0–0.5)
EOSINOPHIL NFR BLD AUTO: 0.2 % — SIGNIFICANT CHANGE UP (ref 0–6)
EOSINOPHIL NFR BLD AUTO: 0.2 % — SIGNIFICANT CHANGE UP (ref 0–6)
ETHANOL SERPL-MCNC: <10 MG/DL — SIGNIFICANT CHANGE UP (ref 0–9)
ETHANOL SERPL-MCNC: <10 MG/DL — SIGNIFICANT CHANGE UP (ref 0–9)
GAS PNL BLDV: 125 MMOL/L — LOW (ref 136–145)
GAS PNL BLDV: 125 MMOL/L — LOW (ref 136–145)
GAS PNL BLDV: SIGNIFICANT CHANGE UP
GLUCOSE BLDV-MCNC: 265 MG/DL — HIGH (ref 70–99)
GLUCOSE BLDV-MCNC: 265 MG/DL — HIGH (ref 70–99)
GLUCOSE SERPL-MCNC: 233 MG/DL — HIGH (ref 70–99)
GLUCOSE SERPL-MCNC: 233 MG/DL — HIGH (ref 70–99)
HCG SERPL-ACNC: <4 MIU/ML — SIGNIFICANT CHANGE UP
HCG SERPL-ACNC: <4 MIU/ML — SIGNIFICANT CHANGE UP
HCO3 BLDV-SCNC: 31 MMOL/L — HIGH (ref 22–29)
HCO3 BLDV-SCNC: 31 MMOL/L — HIGH (ref 22–29)
HCT VFR BLD CALC: 36.9 % — SIGNIFICANT CHANGE UP (ref 34.5–45)
HCT VFR BLD CALC: 36.9 % — SIGNIFICANT CHANGE UP (ref 34.5–45)
HCT VFR BLDA CALC: 40 % — SIGNIFICANT CHANGE UP
HCT VFR BLDA CALC: 40 % — SIGNIFICANT CHANGE UP
HGB BLD-MCNC: 13.3 G/DL — SIGNIFICANT CHANGE UP (ref 11.5–15.5)
HGB BLD-MCNC: 13.3 G/DL — SIGNIFICANT CHANGE UP (ref 11.5–15.5)
IMM GRANULOCYTES NFR BLD AUTO: 0.4 % — SIGNIFICANT CHANGE UP (ref 0–0.9)
IMM GRANULOCYTES NFR BLD AUTO: 0.4 % — SIGNIFICANT CHANGE UP (ref 0–0.9)
LACTATE BLDV-MCNC: 4.7 MMOL/L — CRITICAL HIGH (ref 0.5–2)
LACTATE BLDV-MCNC: 4.7 MMOL/L — CRITICAL HIGH (ref 0.5–2)
LACTATE BLDV-MCNC: 5.2 MMOL/L — CRITICAL HIGH (ref 0.5–2)
LACTATE BLDV-MCNC: 5.2 MMOL/L — CRITICAL HIGH (ref 0.5–2)
LYMPHOCYTES # BLD AUTO: 2.55 K/UL — SIGNIFICANT CHANGE UP (ref 1–3.3)
LYMPHOCYTES # BLD AUTO: 2.55 K/UL — SIGNIFICANT CHANGE UP (ref 1–3.3)
LYMPHOCYTES # BLD AUTO: 45.1 % — HIGH (ref 13–44)
LYMPHOCYTES # BLD AUTO: 45.1 % — HIGH (ref 13–44)
MCHC RBC-ENTMCNC: 33.9 PG — SIGNIFICANT CHANGE UP (ref 27–34)
MCHC RBC-ENTMCNC: 33.9 PG — SIGNIFICANT CHANGE UP (ref 27–34)
MCHC RBC-ENTMCNC: 36 GM/DL — SIGNIFICANT CHANGE UP (ref 32–36)
MCHC RBC-ENTMCNC: 36 GM/DL — SIGNIFICANT CHANGE UP (ref 32–36)
MCV RBC AUTO: 94.1 FL — SIGNIFICANT CHANGE UP (ref 80–100)
MCV RBC AUTO: 94.1 FL — SIGNIFICANT CHANGE UP (ref 80–100)
MONOCYTES # BLD AUTO: 0.17 K/UL — SIGNIFICANT CHANGE UP (ref 0–0.9)
MONOCYTES # BLD AUTO: 0.17 K/UL — SIGNIFICANT CHANGE UP (ref 0–0.9)
MONOCYTES NFR BLD AUTO: 3 % — SIGNIFICANT CHANGE UP (ref 2–14)
MONOCYTES NFR BLD AUTO: 3 % — SIGNIFICANT CHANGE UP (ref 2–14)
NEUTROPHILS # BLD AUTO: 2.84 K/UL — SIGNIFICANT CHANGE UP (ref 1.8–7.4)
NEUTROPHILS # BLD AUTO: 2.84 K/UL — SIGNIFICANT CHANGE UP (ref 1.8–7.4)
NEUTROPHILS NFR BLD AUTO: 50.2 % — SIGNIFICANT CHANGE UP (ref 43–77)
NEUTROPHILS NFR BLD AUTO: 50.2 % — SIGNIFICANT CHANGE UP (ref 43–77)
PCO2 BLDV: 48 MMHG — HIGH (ref 39–42)
PCO2 BLDV: 48 MMHG — HIGH (ref 39–42)
PH BLDV: 7.42 — SIGNIFICANT CHANGE UP (ref 7.32–7.43)
PH BLDV: 7.42 — SIGNIFICANT CHANGE UP (ref 7.32–7.43)
PLATELET # BLD AUTO: 160 K/UL — SIGNIFICANT CHANGE UP (ref 150–400)
PLATELET # BLD AUTO: 160 K/UL — SIGNIFICANT CHANGE UP (ref 150–400)
PO2 BLDV: 49 MMHG — HIGH (ref 25–45)
PO2 BLDV: 49 MMHG — HIGH (ref 25–45)
POTASSIUM BLDV-SCNC: 6 MMOL/L — HIGH (ref 3.5–5.1)
POTASSIUM BLDV-SCNC: 6 MMOL/L — HIGH (ref 3.5–5.1)
POTASSIUM SERPL-MCNC: 4.5 MMOL/L — SIGNIFICANT CHANGE UP (ref 3.5–5.3)
POTASSIUM SERPL-MCNC: 4.5 MMOL/L — SIGNIFICANT CHANGE UP (ref 3.5–5.3)
POTASSIUM SERPL-SCNC: 4.5 MMOL/L — SIGNIFICANT CHANGE UP (ref 3.5–5.3)
POTASSIUM SERPL-SCNC: 4.5 MMOL/L — SIGNIFICANT CHANGE UP (ref 3.5–5.3)
PROT SERPL-MCNC: 5.9 G/DL — LOW (ref 6.6–8.7)
PROT SERPL-MCNC: 5.9 G/DL — LOW (ref 6.6–8.7)
RBC # BLD: 3.92 M/UL — SIGNIFICANT CHANGE UP (ref 3.8–5.2)
RBC # BLD: 3.92 M/UL — SIGNIFICANT CHANGE UP (ref 3.8–5.2)
RBC # FLD: 12.8 % — SIGNIFICANT CHANGE UP (ref 10.3–14.5)
RBC # FLD: 12.8 % — SIGNIFICANT CHANGE UP (ref 10.3–14.5)
SALICYLATES SERPL-MCNC: <0.6 MG/DL — LOW (ref 10–20)
SALICYLATES SERPL-MCNC: <0.6 MG/DL — LOW (ref 10–20)
SAO2 % BLDV: SIGNIFICANT CHANGE UP %
SAO2 % BLDV: SIGNIFICANT CHANGE UP %
SODIUM SERPL-SCNC: 118 MMOL/L — CRITICAL LOW (ref 135–145)
SODIUM SERPL-SCNC: 118 MMOL/L — CRITICAL LOW (ref 135–145)
WBC # BLD: 5.65 K/UL — SIGNIFICANT CHANGE UP (ref 3.8–10.5)
WBC # BLD: 5.65 K/UL — SIGNIFICANT CHANGE UP (ref 3.8–10.5)
WBC # FLD AUTO: 5.65 K/UL — SIGNIFICANT CHANGE UP (ref 3.8–10.5)
WBC # FLD AUTO: 5.65 K/UL — SIGNIFICANT CHANGE UP (ref 3.8–10.5)

## 2023-12-20 PROCEDURE — 99285 EMERGENCY DEPT VISIT HI MDM: CPT

## 2023-12-20 PROCEDURE — 71045 X-RAY EXAM CHEST 1 VIEW: CPT | Mod: 26

## 2023-12-20 RX ORDER — SODIUM CHLORIDE 9 MG/ML
2000 INJECTION INTRAMUSCULAR; INTRAVENOUS; SUBCUTANEOUS ONCE
Refills: 0 | Status: COMPLETED | OUTPATIENT
Start: 2023-12-20 | End: 2023-12-20

## 2023-12-20 RX ADMIN — Medication 2 MILLIGRAM(S): at 18:18

## 2023-12-20 RX ADMIN — SODIUM CHLORIDE 2000 MILLILITER(S): 9 INJECTION INTRAMUSCULAR; INTRAVENOUS; SUBCUTANEOUS at 18:18

## 2023-12-20 RX ADMIN — SODIUM CHLORIDE 2000 MILLILITER(S): 9 INJECTION INTRAMUSCULAR; INTRAVENOUS; SUBCUTANEOUS at 23:14

## 2023-12-20 NOTE — ED PROVIDER NOTE - OBJECTIVE STATEMENT
28-year-old female with past medical history of alcohol use disorder, insulin-dependent diabetes presenting with seizure-like activity.  Patient states that she has been trying to cut down on the amount that she drinks, she has been feeling tremulous and shaky.  She states that today, she felt like she was losing her vision, then does not remember anything until coming to the hospital.  This was witnessed by her father, who states that patient started shaking all over, then became stiff, turned blue, lasted several minutes, followed by postictal period.  Denies any tongue biting or urinary incontinence.  Patient states that she had a similar episode several months ago, also occurred while trying to cut down on the amount that she drinks.  Denies any other drug use.  Patient noted to be hypoglycemic by EMS, fingerstick 50, given glucose, with improvement.  patient states that she still feels tremulous and feels that she is withdrawing from alcohol.

## 2023-12-20 NOTE — ED ADULT TRIAGE NOTE - NS ED NURSE AMBULANCES
[facility]

## 2023-12-20 NOTE — ED ADULT NURSE NOTE - OBJECTIVE STATEMENT
Pt a&ox4 BIBEMS for seizure. Pt notes she drinks 2 pints of liquor a day, last drink was last night, has tremors, headache, and mild nausea. As per triage note pt was hypoglycemic with EMS. Finger stick upon arrival to Emergency Department was 155mg/dl, respirations even and unlabored on room air, family at bedside, no distress noted.

## 2023-12-20 NOTE — CONSULT NOTE ADULT - ASSESSMENT
IMPRESSION/PLAN:  Etoh abuse   ?alcoholic seizure  hyponatremia    Pt w witnessed seizure by family.  Pt reports she has a hx of withdrawal seizures.  Unclear etiology at this time for hyponatremai. Will monitor sodium level. Monitor for etoh withdrawal. PRN BZD. Consider behavioral eval. Reconsult ICU prn.      CNS: CIWA protocol    HEENT: oral care    PULMONARY:supplemental O2 prn    CARDIOVASCULAR: monitor bp    GI: GI prophylaxis.  Feeding     RENAL: monitor uo    INFECTIOUS DISEASE: monitor off abx    HEMATOLOGICAL:  DVT prophylaxis.    ENDOCRINE:  Follow up FS.  Insulin protocol if needed.    MUSCULOSKELETAL: oob to chair        CRITICAL CARE TIME SPENT: 35 minutes IMPRESSION/PLAN:  Etoh abuse   ?alcoholic seizure  hyponatremia    Pt w witnessed seizure by family.  Pt reports she has a hx of withdrawal seizures.  Unclear etiology at this time for hyponatremai. Will monitor sodium level. Monitor for etoh withdrawal. PRN BZD. Consider behavioral eval. Reconsult ICU prn. Repeat metabolic panel show normal sodium level. ? as to whether initial value was aberrant.  Pt noted to have elevated triglycerides. no abd pain. Recommend check lipase. reconsult ICU prn.      CNS: CIWA protocol    HEENT: oral care    PULMONARY:supplemental O2 prn    CARDIOVASCULAR: monitor bp    GI: GI prophylaxis.  Feeding     RENAL: monitor uo    INFECTIOUS DISEASE: monitor off abx    HEMATOLOGICAL:  DVT prophylaxis.    ENDOCRINE:  Follow up FS.  Insulin protocol if needed.    MUSCULOSKELETAL: oob to chair        CRITICAL CARE TIME SPENT: 35 minutes

## 2023-12-20 NOTE — ED ADULT TRIAGE NOTE - CHIEF COMPLAINT QUOTE
biba home c/o "family states she had seizure like activity" ; on arrival EMS found pt to be a&ox4 & hypoglycemic @ 50 ( hx DM 1), EMS gave 25g d10 - repeat ..

## 2023-12-20 NOTE — ED ADULT NURSE REASSESSMENT NOTE - NS ED NURSE REASSESS COMMENT FT1
Pt resting comfortably in stretcher, pt AAOX4, resp even and unlabored on RA, pt came into ED for seizures, pt endorses alcohol use everyday, last drink was last night, pt awaiting repeat lab results, pt educated about plan of care, pt demonstrates understanding through use of teach back method, pt offers no complaints at this time, pt given snack and juice, safety maintained

## 2023-12-20 NOTE — ED ADULT NURSE NOTE - ED STAT RN HANDOFF DETAILS
pt AAOX4, resp. even and unlabored on RA, pt admitted to SDU for alcohol dependence/withdrawal, last CIWA = 4, FS =72 @ 0330, patent 20g IV in RT/AC, pt GI/ continent

## 2023-12-20 NOTE — CONSULT NOTE ADULT - SUBJECTIVE AND OBJECTIVE BOX
Patient is a 28y old  Female who presents with a chief complaint of seizure    HPI: 27 yo female PMHx sig for DM an etoh abuse drinks 2 pints vodka daily.  Reports hx of withdrawal seizures.  Today the patient had a witnessed seizure, she states her last drink was yesterday.       PAST MEDICAL & SURGICAL HISTORY:  Type 2 diabetes mellitus without complication, with long-term current use of insulin      Alcohol abuse        Allergies    No Known Allergies    Intolerances      FAMILY HISTORY:    Home Medications:    Occupation:  Alochol: Denied  Smoking: Denied  Drug Use: Denied  Marital Status:         ROS: as in HPI; All other systems reviewed are negative    ICU Vital Signs Last 24 Hrs  T(C): 36.8 (20 Dec 2023 16:06), Max: 36.8 (20 Dec 2023 16:06)  T(F): 98.3 (20 Dec 2023 16:06), Max: 98.3 (20 Dec 2023 16:06)  HR: 100 (20 Dec 2023 16:06) (100 - 100)  BP: 119/82 (20 Dec 2023 16:06) (119/82 - 119/82)  BP(mean): --  ABP: --  ABP(mean): --  RR: 17 (20 Dec 2023 16:06) (17 - 17)  SpO2: 100% (20 Dec 2023 16:06) (100% - 100%)    O2 Parameters below as of 20 Dec 2023 16:06  Patient On (Oxygen Delivery Method): room air              Physical Examination:    General: No acute distress.  Alert, oriented, interactive, nonfocal    HEENT: Pupils equal, reactive to light.  Symmetric.    PULM: Clear to auscultation bilaterally, no significant sputum production    CVS: Regular rate and rhythm, no murmurs, rubs, or gallops    ABD: Soft, nondistended, nontender, normoactive bowel sounds, no masses    EXT: No edema, nontender    SKIN: Warm and well perfused, no rashes noted.              I&O's Detail        LABS:                        13.3   5.65  )-----------( 160      ( 20 Dec 2023 16:51 )             36.9     20 Dec 2023 18:12    118    |  78     |  15.0   ----------------------------<  233    4.5     |  26.0   |  0.65     Ca    8.3        20 Dec 2023 18:12    TPro  5.9    /  Alb  2.8    /  TBili  1.4    /  DBili  x      /  AST  SeeComment Unable to result  Notified Dr. BROCK Jama  12/20/2023 20:19:48 EST  AB  Will send another sample  /  ALT  SeeComment Unable to result  Notified Dr. BROCK Jama  12/20/2023 20:19:48 EST  AB  Will send another sample  /  AlkPhos  124    20 Dec 2023 18:12  Amylase x     lipase x          CARDIAC MARKERS ( 20 Dec 2023 18:12 )  x     / x     / 173 U/L / x     / 2.7 ng/mL      CAPILLARY BLOOD GLUCOSE      POCT Blood Glucose.: 155 mg/dL (20 Dec 2023 16:08)      Urinalysis Basic - ( 20 Dec 2023 18:12 )    Color: x / Appearance: x / SG: x / pH: x  Gluc: 233 mg/dL / Ketone: x  / Bili: x / Urobili: x   Blood: x / Protein: x / Nitrite: x   Leuk Esterase: x / RBC: x / WBC x   Sq Epi: x / Non Sq Epi: x / Bacteria: x      Culture        MEDICATIONS  (STANDING):    MEDICATIONS  (PRN):

## 2023-12-20 NOTE — ED PROVIDER NOTE - CLINICAL SUMMARY MEDICAL DECISION MAKING FREE TEXT BOX
28-year-old female presenting with seizure and hypoglycemia in setting of alcohol withdrawal.  Patient CIWA 9, will give Ativan, check labs, patient will likely require admission for further management.

## 2023-12-20 NOTE — ED PROVIDER NOTE - PROGRESS NOTE DETAILS
osbaldo:  Pt signed out to me by dr. penn pending labs and admission.  labs showed hyponatremia and it was lipemic. Pt was already receiving IVF. micu consulted and recommended repeat labs after ivf finish. repeat labs showed sodium much better. also found to be hypertriglyceridemic which likely accounts for the hyponatremia as this causes pseudohyponatremia. pt with no abd pain or n/v.  pt neuro intact. ciwa improved with ativan. case d/w dr. hansen for admission.

## 2023-12-20 NOTE — ED PROVIDER NOTE - PHYSICAL EXAMINATION
Gen: NAD, AOx3  Head: NCAT  HEENT: oral mucosa moist, normal conjunctiva, oropharynx clear without exudate or erythema  Lung: CTAB, no respiratory distress, no wheezing, rales, rhonchi  CV: normal s1/s2, rrr, no murmurs, Normal perfusion, pulses 2+ throughout  Abd: soft, NTND  MSK: No edema, no visible deformities, full range of motion in all 4 extremities  Neuro: moderately tremulous, CN II-XII grossly intact, No focal neurologic deficits  Skin: No rash   Psych: normal affect

## 2023-12-20 NOTE — ED ADULT NURSE NOTE - NSFALLRISKINTERV_ED_ALL_ED
Assistance OOB with selected safe patient handling equipment if applicable/Assistance with ambulation/Communicate fall risk and risk factors to all staff, patient, and family/Monitor gait and stability/Monitor for mental status changes and reorient to person, place, and time, as needed/Provide visual cue: yellow wristband, yellow gown, etc/Reinforce activity limits and safety measures with patient and family/Toileting schedule using arm’s reach rule for commode and bathroom/Use of alarms - bed, stretcher, chair and/or video monitoring/Call bell, personal items and telephone in reach/Instruct patient to call for assistance before getting out of bed/chair/stretcher/Non-slip footwear applied when patient is off stretcher/[place] to call system/Physically safe environment - no spills, clutter or unnecessary equipment/Purposeful Proactive Rounding/Room/bathroom lighting operational, light cord in reach Assistance OOB with selected safe patient handling equipment if applicable/Assistance with ambulation/Communicate fall risk and risk factors to all staff, patient, and family/Monitor gait and stability/Monitor for mental status changes and reorient to person, place, and time, as needed/Provide visual cue: yellow wristband, yellow gown, etc/Reinforce activity limits and safety measures with patient and family/Toileting schedule using arm’s reach rule for commode and bathroom/Use of alarms - bed, stretcher, chair and/or video monitoring/Call bell, personal items and telephone in reach/Instruct patient to call for assistance before getting out of bed/chair/stretcher/Non-slip footwear applied when patient is off stretcher/[place] to call system/Physically safe environment - no spills, clutter or unnecessary equipment/Purposeful Proactive Rounding/Room/bathroom lighting operational, light cord in reach

## 2023-12-21 ENCOUNTER — TRANSCRIPTION ENCOUNTER (OUTPATIENT)
Age: 28
End: 2023-12-21

## 2023-12-21 DIAGNOSIS — F10.239 ALCOHOL DEPENDENCE WITH WITHDRAWAL, UNSPECIFIED: ICD-10-CM

## 2023-12-21 LAB
A1C WITH ESTIMATED AVERAGE GLUCOSE RESULT: 8.4 % — HIGH (ref 4–5.6)
A1C WITH ESTIMATED AVERAGE GLUCOSE RESULT: 8.4 % — HIGH (ref 4–5.6)
ALBUMIN SERPL ELPH-MCNC: 2 G/DL — LOW (ref 3.3–5.2)
ALBUMIN SERPL ELPH-MCNC: 2 G/DL — LOW (ref 3.3–5.2)
ALBUMIN SERPL ELPH-MCNC: 2.2 G/DL — LOW (ref 3.3–5.2)
ALBUMIN SERPL ELPH-MCNC: 2.2 G/DL — LOW (ref 3.3–5.2)
ALP SERPL-CCNC: 123 U/L — HIGH (ref 40–120)
ALP SERPL-CCNC: 123 U/L — HIGH (ref 40–120)
ALP SERPL-CCNC: 87 U/L — SIGNIFICANT CHANGE UP (ref 40–120)
ALP SERPL-CCNC: 87 U/L — SIGNIFICANT CHANGE UP (ref 40–120)
ALT FLD-CCNC: 50 U/L — HIGH
ALT FLD-CCNC: 50 U/L — HIGH
ALT FLD-CCNC: SIGNIFICANT CHANGE UP U/L
ALT FLD-CCNC: SIGNIFICANT CHANGE UP U/L
ANION GAP SERPL CALC-SCNC: 11 MMOL/L — SIGNIFICANT CHANGE UP (ref 5–17)
ANION GAP SERPL CALC-SCNC: 11 MMOL/L — SIGNIFICANT CHANGE UP (ref 5–17)
ANION GAP SERPL CALC-SCNC: 12 MMOL/L — SIGNIFICANT CHANGE UP (ref 5–17)
ANION GAP SERPL CALC-SCNC: 12 MMOL/L — SIGNIFICANT CHANGE UP (ref 5–17)
AST SERPL-CCNC: 171 U/L — HIGH
AST SERPL-CCNC: 171 U/L — HIGH
AST SERPL-CCNC: 96 U/L — HIGH
AST SERPL-CCNC: 96 U/L — HIGH
BILIRUB SERPL-MCNC: 1.1 MG/DL — SIGNIFICANT CHANGE UP (ref 0.4–2)
BILIRUB SERPL-MCNC: 1.1 MG/DL — SIGNIFICANT CHANGE UP (ref 0.4–2)
BILIRUB SERPL-MCNC: 2 MG/DL — SIGNIFICANT CHANGE UP (ref 0.4–2)
BILIRUB SERPL-MCNC: 2 MG/DL — SIGNIFICANT CHANGE UP (ref 0.4–2)
BUN SERPL-MCNC: 10.7 MG/DL — SIGNIFICANT CHANGE UP (ref 8–20)
BUN SERPL-MCNC: 10.7 MG/DL — SIGNIFICANT CHANGE UP (ref 8–20)
BUN SERPL-MCNC: 8.9 MG/DL — SIGNIFICANT CHANGE UP (ref 8–20)
BUN SERPL-MCNC: 8.9 MG/DL — SIGNIFICANT CHANGE UP (ref 8–20)
CALCIUM SERPL-MCNC: 6 MG/DL — CRITICAL LOW (ref 8.4–10.5)
CALCIUM SERPL-MCNC: 6 MG/DL — CRITICAL LOW (ref 8.4–10.5)
CALCIUM SERPL-MCNC: 8.3 MG/DL — LOW (ref 8.4–10.5)
CALCIUM SERPL-MCNC: 8.3 MG/DL — LOW (ref 8.4–10.5)
CHLORIDE SERPL-SCNC: 89 MMOL/L — LOW (ref 96–108)
CHLORIDE SERPL-SCNC: 89 MMOL/L — LOW (ref 96–108)
CHLORIDE SERPL-SCNC: 98 MMOL/L — SIGNIFICANT CHANGE UP (ref 96–108)
CHLORIDE SERPL-SCNC: 98 MMOL/L — SIGNIFICANT CHANGE UP (ref 96–108)
CHOLEST SERPL-MCNC: 587 MG/DL — HIGH
CHOLEST SERPL-MCNC: 587 MG/DL — HIGH
CO2 SERPL-SCNC: 21 MMOL/L — LOW (ref 22–29)
CO2 SERPL-SCNC: 21 MMOL/L — LOW (ref 22–29)
CO2 SERPL-SCNC: 23 MMOL/L — SIGNIFICANT CHANGE UP (ref 22–29)
CO2 SERPL-SCNC: 23 MMOL/L — SIGNIFICANT CHANGE UP (ref 22–29)
CREAT SERPL-MCNC: 0.51 MG/DL — SIGNIFICANT CHANGE UP (ref 0.5–1.3)
CREAT SERPL-MCNC: 0.51 MG/DL — SIGNIFICANT CHANGE UP (ref 0.5–1.3)
CREAT SERPL-MCNC: 0.56 MG/DL — SIGNIFICANT CHANGE UP (ref 0.5–1.3)
CREAT SERPL-MCNC: 0.56 MG/DL — SIGNIFICANT CHANGE UP (ref 0.5–1.3)
EGFR: 128 ML/MIN/1.73M2 — SIGNIFICANT CHANGE UP
EGFR: 128 ML/MIN/1.73M2 — SIGNIFICANT CHANGE UP
EGFR: 131 ML/MIN/1.73M2 — SIGNIFICANT CHANGE UP
EGFR: 131 ML/MIN/1.73M2 — SIGNIFICANT CHANGE UP
ESTIMATED AVERAGE GLUCOSE: 194 MG/DL — HIGH (ref 68–114)
ESTIMATED AVERAGE GLUCOSE: 194 MG/DL — HIGH (ref 68–114)
GAS PNL BLDA: SIGNIFICANT CHANGE UP
GAS PNL BLDA: SIGNIFICANT CHANGE UP
GLUCOSE BLDC GLUCOMTR-MCNC: 126 MG/DL — HIGH (ref 70–99)
GLUCOSE BLDC GLUCOMTR-MCNC: 126 MG/DL — HIGH (ref 70–99)
GLUCOSE BLDC GLUCOMTR-MCNC: 172 MG/DL — HIGH (ref 70–99)
GLUCOSE BLDC GLUCOMTR-MCNC: 172 MG/DL — HIGH (ref 70–99)
GLUCOSE BLDC GLUCOMTR-MCNC: 198 MG/DL — HIGH (ref 70–99)
GLUCOSE BLDC GLUCOMTR-MCNC: 223 MG/DL — HIGH (ref 70–99)
GLUCOSE BLDC GLUCOMTR-MCNC: 223 MG/DL — HIGH (ref 70–99)
GLUCOSE BLDC GLUCOMTR-MCNC: 231 MG/DL — HIGH (ref 70–99)
GLUCOSE BLDC GLUCOMTR-MCNC: 231 MG/DL — HIGH (ref 70–99)
GLUCOSE BLDC GLUCOMTR-MCNC: 252 MG/DL — HIGH (ref 70–99)
GLUCOSE BLDC GLUCOMTR-MCNC: 252 MG/DL — HIGH (ref 70–99)
GLUCOSE BLDC GLUCOMTR-MCNC: 284 MG/DL — HIGH (ref 70–99)
GLUCOSE BLDC GLUCOMTR-MCNC: 284 MG/DL — HIGH (ref 70–99)
GLUCOSE BLDC GLUCOMTR-MCNC: 327 MG/DL — HIGH (ref 70–99)
GLUCOSE BLDC GLUCOMTR-MCNC: 327 MG/DL — HIGH (ref 70–99)
GLUCOSE BLDC GLUCOMTR-MCNC: 339 MG/DL — HIGH (ref 70–99)
GLUCOSE BLDC GLUCOMTR-MCNC: 339 MG/DL — HIGH (ref 70–99)
GLUCOSE BLDC GLUCOMTR-MCNC: 380 MG/DL — HIGH (ref 70–99)
GLUCOSE BLDC GLUCOMTR-MCNC: 380 MG/DL — HIGH (ref 70–99)
GLUCOSE BLDC GLUCOMTR-MCNC: 410 MG/DL — HIGH (ref 70–99)
GLUCOSE BLDC GLUCOMTR-MCNC: 410 MG/DL — HIGH (ref 70–99)
GLUCOSE BLDC GLUCOMTR-MCNC: 57 MG/DL — LOW (ref 70–99)
GLUCOSE BLDC GLUCOMTR-MCNC: 57 MG/DL — LOW (ref 70–99)
GLUCOSE BLDC GLUCOMTR-MCNC: 64 MG/DL — LOW (ref 70–99)
GLUCOSE BLDC GLUCOMTR-MCNC: 64 MG/DL — LOW (ref 70–99)
GLUCOSE BLDC GLUCOMTR-MCNC: 72 MG/DL — SIGNIFICANT CHANGE UP (ref 70–99)
GLUCOSE BLDC GLUCOMTR-MCNC: 72 MG/DL — SIGNIFICANT CHANGE UP (ref 70–99)
GLUCOSE SERPL-MCNC: 156 MG/DL — HIGH (ref 70–99)
GLUCOSE SERPL-MCNC: 156 MG/DL — HIGH (ref 70–99)
GLUCOSE SERPL-MCNC: 181 MG/DL — HIGH (ref 70–99)
GLUCOSE SERPL-MCNC: 181 MG/DL — HIGH (ref 70–99)
HCT VFR BLD CALC: 25.8 % — LOW (ref 34.5–45)
HCT VFR BLD CALC: 25.8 % — LOW (ref 34.5–45)
HDLC SERPL-MCNC: 4 MG/DL — LOW
HDLC SERPL-MCNC: 4 MG/DL — LOW
HGB BLD-MCNC: 9.4 G/DL — LOW (ref 11.5–15.5)
HGB BLD-MCNC: 9.4 G/DL — LOW (ref 11.5–15.5)
LACTATE SERPL-SCNC: 2.1 MMOL/L — HIGH (ref 0.5–2)
LACTATE SERPL-SCNC: 2.1 MMOL/L — HIGH (ref 0.5–2)
LIDOCAIN IGE QN: 20 U/L — LOW (ref 22–51)
LIDOCAIN IGE QN: 20 U/L — LOW (ref 22–51)
LIPID PNL WITH DIRECT LDL SERPL: SIGNIFICANT CHANGE UP MG/DL
LIPID PNL WITH DIRECT LDL SERPL: SIGNIFICANT CHANGE UP MG/DL
MAGNESIUM SERPL-MCNC: 1.8 MG/DL — SIGNIFICANT CHANGE UP (ref 1.6–2.6)
MAGNESIUM SERPL-MCNC: 1.8 MG/DL — SIGNIFICANT CHANGE UP (ref 1.6–2.6)
MCHC RBC-ENTMCNC: 34.2 PG — HIGH (ref 27–34)
MCHC RBC-ENTMCNC: 34.2 PG — HIGH (ref 27–34)
MCHC RBC-ENTMCNC: 36.4 GM/DL — HIGH (ref 32–36)
MCHC RBC-ENTMCNC: 36.4 GM/DL — HIGH (ref 32–36)
MCV RBC AUTO: 93.8 FL — SIGNIFICANT CHANGE UP (ref 80–100)
MCV RBC AUTO: 93.8 FL — SIGNIFICANT CHANGE UP (ref 80–100)
MRSA PCR RESULT.: SIGNIFICANT CHANGE UP
MRSA PCR RESULT.: SIGNIFICANT CHANGE UP
NON HDL CHOLESTEROL: 583 MG/DL — HIGH
NON HDL CHOLESTEROL: 583 MG/DL — HIGH
PLATELET # BLD AUTO: 143 K/UL — LOW (ref 150–400)
PLATELET # BLD AUTO: 143 K/UL — LOW (ref 150–400)
POTASSIUM SERPL-MCNC: 3.6 MMOL/L — SIGNIFICANT CHANGE UP (ref 3.5–5.3)
POTASSIUM SERPL-MCNC: 3.6 MMOL/L — SIGNIFICANT CHANGE UP (ref 3.5–5.3)
POTASSIUM SERPL-MCNC: 3.9 MMOL/L — SIGNIFICANT CHANGE UP (ref 3.5–5.3)
POTASSIUM SERPL-MCNC: 3.9 MMOL/L — SIGNIFICANT CHANGE UP (ref 3.5–5.3)
POTASSIUM SERPL-SCNC: 3.6 MMOL/L — SIGNIFICANT CHANGE UP (ref 3.5–5.3)
POTASSIUM SERPL-SCNC: 3.6 MMOL/L — SIGNIFICANT CHANGE UP (ref 3.5–5.3)
POTASSIUM SERPL-SCNC: 3.9 MMOL/L — SIGNIFICANT CHANGE UP (ref 3.5–5.3)
POTASSIUM SERPL-SCNC: 3.9 MMOL/L — SIGNIFICANT CHANGE UP (ref 3.5–5.3)
PROT SERPL-MCNC: 4.2 G/DL — LOW (ref 6.6–8.7)
PROT SERPL-MCNC: 4.2 G/DL — LOW (ref 6.6–8.7)
PROT SERPL-MCNC: 4.9 G/DL — LOW (ref 6.6–8.7)
PROT SERPL-MCNC: 4.9 G/DL — LOW (ref 6.6–8.7)
RBC # BLD: 3.68 M/UL — LOW (ref 3.8–5.2)
RBC # BLD: 3.68 M/UL — LOW (ref 3.8–5.2)
RBC # FLD: 13.2 % — SIGNIFICANT CHANGE UP (ref 10.3–14.5)
RBC # FLD: 13.2 % — SIGNIFICANT CHANGE UP (ref 10.3–14.5)
S AUREUS DNA NOSE QL NAA+PROBE: DETECTED
S AUREUS DNA NOSE QL NAA+PROBE: DETECTED
SODIUM SERPL-SCNC: 124 MMOL/L — LOW (ref 135–145)
SODIUM SERPL-SCNC: 124 MMOL/L — LOW (ref 135–145)
SODIUM SERPL-SCNC: 129 MMOL/L — LOW (ref 135–145)
SODIUM SERPL-SCNC: 129 MMOL/L — LOW (ref 135–145)
TRIGL SERPL-MCNC: >4425 MG/DL — HIGH
TRIGL SERPL-MCNC: >4425 MG/DL — HIGH
TROPONIN T, HIGH SENSITIVITY RESULT: 6 NG/L — SIGNIFICANT CHANGE UP (ref 0–51)
TROPONIN T, HIGH SENSITIVITY RESULT: 6 NG/L — SIGNIFICANT CHANGE UP (ref 0–51)
WBC # BLD: 7.14 K/UL — SIGNIFICANT CHANGE UP (ref 3.8–10.5)
WBC # BLD: 7.14 K/UL — SIGNIFICANT CHANGE UP (ref 3.8–10.5)
WBC # FLD AUTO: 7.14 K/UL — SIGNIFICANT CHANGE UP (ref 3.8–10.5)
WBC # FLD AUTO: 7.14 K/UL — SIGNIFICANT CHANGE UP (ref 3.8–10.5)

## 2023-12-21 PROCEDURE — 99291 CRITICAL CARE FIRST HOUR: CPT | Mod: 25

## 2023-12-21 PROCEDURE — 99223 1ST HOSP IP/OBS HIGH 75: CPT

## 2023-12-21 PROCEDURE — 93010 ELECTROCARDIOGRAM REPORT: CPT | Mod: 77

## 2023-12-21 PROCEDURE — 93321 DOPPLER ECHO F-UP/LMTD STD: CPT | Mod: 26

## 2023-12-21 PROCEDURE — 93010 ELECTROCARDIOGRAM REPORT: CPT

## 2023-12-21 PROCEDURE — 93308 TTE F-UP OR LMTD: CPT | Mod: 26

## 2023-12-21 RX ORDER — THIAMINE MONONITRATE (VIT B1) 100 MG
100 TABLET ORAL DAILY
Refills: 0 | Status: DISCONTINUED | OUTPATIENT
Start: 2023-12-21 | End: 2023-12-24

## 2023-12-21 RX ORDER — INSULIN HUMAN 100 [IU]/ML
10 INJECTION, SOLUTION SUBCUTANEOUS
Qty: 100 | Refills: 0 | Status: DISCONTINUED | OUTPATIENT
Start: 2023-12-21 | End: 2023-12-22

## 2023-12-21 RX ORDER — FENOFIBRATE,MICRONIZED 130 MG
145 CAPSULE ORAL DAILY
Refills: 0 | Status: DISCONTINUED | OUTPATIENT
Start: 2023-12-21 | End: 2023-12-24

## 2023-12-21 RX ORDER — PANTOPRAZOLE SODIUM 20 MG/1
40 TABLET, DELAYED RELEASE ORAL
Refills: 0 | Status: DISCONTINUED | OUTPATIENT
Start: 2023-12-21 | End: 2023-12-24

## 2023-12-21 RX ORDER — ACETAMINOPHEN 500 MG
650 TABLET ORAL EVERY 6 HOURS
Refills: 0 | Status: DISCONTINUED | OUTPATIENT
Start: 2023-12-21 | End: 2023-12-24

## 2023-12-21 RX ORDER — INFLUENZA VIRUS VACCINE 15; 15; 15; 15 UG/.5ML; UG/.5ML; UG/.5ML; UG/.5ML
0.5 SUSPENSION INTRAMUSCULAR ONCE
Refills: 0 | Status: DISCONTINUED | OUTPATIENT
Start: 2023-12-21 | End: 2023-12-24

## 2023-12-21 RX ORDER — LANOLIN ALCOHOL/MO/W.PET/CERES
3 CREAM (GRAM) TOPICAL AT BEDTIME
Refills: 0 | Status: DISCONTINUED | OUTPATIENT
Start: 2023-12-21 | End: 2023-12-24

## 2023-12-21 RX ORDER — INSULIN GLARGINE 100 [IU]/ML
10 INJECTION, SOLUTION SUBCUTANEOUS AT BEDTIME
Refills: 0 | Status: DISCONTINUED | OUTPATIENT
Start: 2023-12-21 | End: 2023-12-21

## 2023-12-21 RX ORDER — ONDANSETRON 8 MG/1
4 TABLET, FILM COATED ORAL EVERY 8 HOURS
Refills: 0 | Status: DISCONTINUED | OUTPATIENT
Start: 2023-12-21 | End: 2023-12-24

## 2023-12-21 RX ORDER — DEXTROSE 10 % IN WATER 10 %
1000 INTRAVENOUS SOLUTION INTRAVENOUS
Refills: 0 | Status: DISCONTINUED | OUTPATIENT
Start: 2023-12-21 | End: 2023-12-22

## 2023-12-21 RX ORDER — CHLORHEXIDINE GLUCONATE 213 G/1000ML
1 SOLUTION TOPICAL
Refills: 0 | Status: DISCONTINUED | OUTPATIENT
Start: 2023-12-21 | End: 2023-12-24

## 2023-12-21 RX ORDER — INSULIN LISPRO 100/ML
VIAL (ML) SUBCUTANEOUS
Refills: 0 | Status: DISCONTINUED | OUTPATIENT
Start: 2023-12-21 | End: 2023-12-21

## 2023-12-21 RX ORDER — GABAPENTIN 400 MG/1
0 CAPSULE ORAL
Refills: 0 | DISCHARGE

## 2023-12-21 RX ORDER — SODIUM CHLORIDE 9 MG/ML
1000 INJECTION, SOLUTION INTRAVENOUS
Refills: 0 | Status: DISCONTINUED | OUTPATIENT
Start: 2023-12-21 | End: 2023-12-21

## 2023-12-21 RX ORDER — DEXTROSE 50 % IN WATER 50 %
25 SYRINGE (ML) INTRAVENOUS ONCE
Refills: 0 | Status: DISCONTINUED | OUTPATIENT
Start: 2023-12-21 | End: 2023-12-24

## 2023-12-21 RX ORDER — INSULIN LISPRO 100/ML
VIAL (ML) SUBCUTANEOUS AT BEDTIME
Refills: 0 | Status: DISCONTINUED | OUTPATIENT
Start: 2023-12-21 | End: 2023-12-21

## 2023-12-21 RX ORDER — POTASSIUM CHLORIDE 20 MEQ
10 PACKET (EA) ORAL
Refills: 0 | Status: COMPLETED | OUTPATIENT
Start: 2023-12-21 | End: 2023-12-22

## 2023-12-21 RX ORDER — OMEGA-3 ACID ETHYL ESTERS 1 G
2 CAPSULE ORAL
Refills: 0 | Status: DISCONTINUED | OUTPATIENT
Start: 2023-12-21 | End: 2023-12-24

## 2023-12-21 RX ORDER — GABAPENTIN 400 MG/1
100 CAPSULE ORAL THREE TIMES A DAY
Refills: 0 | Status: DISCONTINUED | OUTPATIENT
Start: 2023-12-21 | End: 2023-12-24

## 2023-12-21 RX ORDER — GLUCAGON INJECTION, SOLUTION 0.5 MG/.1ML
1 INJECTION, SOLUTION SUBCUTANEOUS ONCE
Refills: 0 | Status: DISCONTINUED | OUTPATIENT
Start: 2023-12-21 | End: 2023-12-24

## 2023-12-21 RX ORDER — FOLIC ACID 0.8 MG
1 TABLET ORAL DAILY
Refills: 0 | Status: DISCONTINUED | OUTPATIENT
Start: 2023-12-21 | End: 2023-12-24

## 2023-12-21 RX ORDER — SODIUM CHLORIDE 9 MG/ML
1000 INJECTION INTRAMUSCULAR; INTRAVENOUS; SUBCUTANEOUS
Refills: 0 | Status: DISCONTINUED | OUTPATIENT
Start: 2023-12-21 | End: 2023-12-21

## 2023-12-21 RX ORDER — CALCIUM GLUCONATE 100 MG/ML
1 VIAL (ML) INTRAVENOUS ONCE
Refills: 0 | Status: COMPLETED | OUTPATIENT
Start: 2023-12-21 | End: 2023-12-21

## 2023-12-21 RX ORDER — DEXTROSE 50 % IN WATER 50 %
15 SYRINGE (ML) INTRAVENOUS ONCE
Refills: 0 | Status: DISCONTINUED | OUTPATIENT
Start: 2023-12-21 | End: 2023-12-24

## 2023-12-21 RX ORDER — DEXTROSE 50 % IN WATER 50 %
50 SYRINGE (ML) INTRAVENOUS ONCE
Refills: 0 | Status: COMPLETED | OUTPATIENT
Start: 2023-12-21 | End: 2023-12-21

## 2023-12-21 RX ORDER — DEXTROSE 50 % IN WATER 50 %
12.5 SYRINGE (ML) INTRAVENOUS ONCE
Refills: 0 | Status: DISCONTINUED | OUTPATIENT
Start: 2023-12-21 | End: 2023-12-24

## 2023-12-21 RX ORDER — INSULIN GLARGINE 100 [IU]/ML
16 INJECTION, SOLUTION SUBCUTANEOUS
Refills: 0 | DISCHARGE

## 2023-12-21 RX ADMIN — Medication 1 TABLET(S): at 14:38

## 2023-12-21 RX ADMIN — Medication 650 MILLIGRAM(S): at 05:53

## 2023-12-21 RX ADMIN — Medication 145 MILLIGRAM(S): at 14:37

## 2023-12-21 RX ADMIN — GABAPENTIN 100 MILLIGRAM(S): 400 CAPSULE ORAL at 05:29

## 2023-12-21 RX ADMIN — Medication 2 GRAM(S): at 06:23

## 2023-12-21 RX ADMIN — CHLORHEXIDINE GLUCONATE 1 APPLICATION(S): 213 SOLUTION TOPICAL at 17:35

## 2023-12-21 RX ADMIN — Medication 2 GRAM(S): at 17:34

## 2023-12-21 RX ADMIN — Medication 100 GRAM(S): at 06:23

## 2023-12-21 RX ADMIN — Medication 1 MILLIGRAM(S): at 14:38

## 2023-12-21 RX ADMIN — Medication 50 MILLILITER(S): at 18:25

## 2023-12-21 RX ADMIN — Medication 100 MILLIEQUIVALENT(S): at 22:38

## 2023-12-21 RX ADMIN — INSULIN HUMAN 15 UNIT(S)/HR: 100 INJECTION, SOLUTION SUBCUTANEOUS at 20:40

## 2023-12-21 RX ADMIN — PANTOPRAZOLE SODIUM 40 MILLIGRAM(S): 20 TABLET, DELAYED RELEASE ORAL at 17:35

## 2023-12-21 RX ADMIN — Medication 100 MILLIGRAM(S): at 14:38

## 2023-12-21 RX ADMIN — INSULIN HUMAN 20 UNIT(S)/HR: 100 INJECTION, SOLUTION SUBCUTANEOUS at 14:36

## 2023-12-21 RX ADMIN — Medication 2 MILLIGRAM(S): at 17:35

## 2023-12-21 RX ADMIN — Medication 2 MILLIGRAM(S): at 09:53

## 2023-12-21 RX ADMIN — Medication 2 MILLIGRAM(S): at 21:20

## 2023-12-21 RX ADMIN — Medication 650 MILLIGRAM(S): at 04:53

## 2023-12-21 RX ADMIN — Medication 2 MILLIGRAM(S): at 14:37

## 2023-12-21 RX ADMIN — Medication 100 MILLIEQUIVALENT(S): at 23:48

## 2023-12-21 RX ADMIN — Medication 75 MILLILITER(S): at 14:38

## 2023-12-21 RX ADMIN — GABAPENTIN 100 MILLIGRAM(S): 400 CAPSULE ORAL at 21:21

## 2023-12-21 RX ADMIN — GABAPENTIN 100 MILLIGRAM(S): 400 CAPSULE ORAL at 14:37

## 2023-12-21 RX ADMIN — Medication 2 MILLIGRAM(S): at 05:12

## 2023-12-21 NOTE — H&P ADULT - TIME BILLING
Chart, labs and imaging reviewed. Physical examination, medication reconciliation and documentation. Discussion with patient and ER nurses.

## 2023-12-21 NOTE — H&P ADULT - ASSESSMENT
29 y/o female with PMH of EtOH abuse, IDDM, congenital heart block was brought to the ED for seizure likely alcohol induced.  Found to have hypoglycemia (FS: 50)  by EMS. In the ED, FS: 155.  Na: 118, TG: > 4425, LA: 5.20.       Seizure likely due to alcohol withdrawal   Admit to step down   S/p Ativan 2mg in the ED   CIWA protocol with Ativan pati   Folic acid   Thiamine for possible thiamine deficiency due to EtOH   MVT  Continue gently hydration    EtOH cessation advised   Aspiration/seizure precaution     Hypoglycemia with underlying DM  Will get HbA1C with AM lab   Patient on Glargine 16 units ; will give 10units for now, adjust as needed   Insulin sliding scale     Hypertriglyceridemia  TG: > 4425   Continue IVF   Will start Omega 3 bid   Fenofibrate 145mg   Endocrinology consulted     Hyponatremia   Na: 118, repeat 129  MICU consulted   Initial Na read thought to be due to elevated TG  Monitor BMP     Hypocalcemia   Ca: 6.0   Corrected for albumin 7.6  Will supplement   Monitor ca     Bradycardia   Patient reported hx of congenital heart block   Pacer pads placed on patient   Continue telemonitoring      Lactic acidosis   LA: 5.20---->4.70  Likely due to seizure/EtOH   Continue gently hydration   Trend LA     Supportive   DVT prophylaxis: CSD (VTE: 0)   Diet: regular     Plan of care discussed with patient and ER/CDU nurse  27 y/o female with PMH of EtOH abuse, IDDM, congenital heart block was brought to the ED for seizure likely alcohol induced.  Found to have hypoglycemia (FS: 50)  by EMS. In the ED, FS: 155.  Na: 118, TG: > 4425, LA: 5.20.       Seizure likely due to alcohol withdrawal   Admit to step down   S/p Ativan 2mg in the ED   CIWA protocol with Ativan pati   Folic acid   Thiamine for possible thiamine deficiency due to EtOH   MVT  Continue gently hydration    EtOH cessation advised   Aspiration/seizure precaution     Hypoglycemia with underlying DM  Will get HbA1C with AM lab   Patient on Glargine 16 units ; will give 10units for now, adjust as needed   Insulin sliding scale     Hypertriglyceridemia  TG: > 4425   Continue IVF   Will start Omega 3 bid   Fenofibrate 145mg   Endocrinology consulted     Hyponatremia   Na: 118, repeat 129  MICU consulted   Initial Na read thought to be due to elevated TG  Monitor BMP     Hypocalcemia   Ca: 6.0   Corrected for albumin 7.6  Will supplement   Monitor ca     Bradycardia   Patient reported hx of congenital heart block   Pacer pads placed on patient   Continue telemonitoring      Lactic acidosis   LA: 5.20---->4.70  Likely due to seizure/EtOH   Continue gently hydration   Trend LA     Supportive   DVT prophylaxis: CSD (VTE: 0)   Diet: regular     Plan of care discussed with patient and ER/CDU nurse

## 2023-12-21 NOTE — CONSULT NOTE ADULT - ATTENDING COMMENTS
PCCM Attending Attestation:    Patient was seen and examined at the bedside. I was physically present for the key portions of the evaluation and management (E/M) service provided. Agree with history, physical exam, assessment, and plan as outlined by housestaff note above, with the following additions and/or comments:    28F PMH IDDM1, EtOH use, congenital heart block, hypoglycemic seizures who presented 12/21/23 after a witnessed seizure. She reports this usually occurs when she is hypoglycemic (around 70s). She drinks 2 pints of Vodka daily, and has been trying to cut down. She was started on Ativan taper in the ED. Noted on labs to have TG >4000, and started on insulin gtt and admitted to ICU. Endocrine following. Lipase 20. Serial BMP Q4H. Insulin gtt at higher dosing - starting at 20U/hr and can increase if need be; started on D10 infusion to prevent hypoglycemia. Thiamine/Folate/MVI. Admit to ICU.        Devyn Rodríguez M.D.  , Pulmonary & Critical Care Medicine  [facility]  Pulmonary and Sleep Medicine at [place]  [address]  [place], [place] [address]  T: [phone]  F: [phone] PCCM Attending Attestation:    Patient was seen and examined at the bedside. I was physically present for the key portions of the evaluation and management (E/M) service provided. Agree with history, physical exam, assessment, and plan as outlined by housestaff note above, with the following additions and/or comments:    28F PMH IDDM1, EtOH use, congenital heart block, hypoglycemic seizures who presented 12/21/23 after a witnessed seizure. She reports this usually occurs when she is hypoglycemic (around 70s). She drinks 2 pints of Vodka daily, and has been trying to cut down. She was started on Ativan taper in the ED. Noted on labs to have TG >4000, and started on insulin gtt and admitted to ICU. Endocrine following. Lipase 20. Serial BMP Q4H. Insulin gtt at higher dosing - starting at 20U/hr and can increase if need be; started on D10 infusion to prevent hypoglycemia. Thiamine/Folate/MVI. Admit to ICU.        Devyn Rodríguez M.D.  , Pulmonary & Critical Care Medicine  [facility]  Pulmonary and Sleep Medicine at [place]  [address]  [place], [place] [address]  T: [phone]  F: [phone]

## 2023-12-21 NOTE — H&P ADULT - NSHPPHYSICALEXAM_GEN_ALL_CORE
Vital Signs Last 24 Hrs  T(C): 36.9 (21 Dec 2023 04:30), Max: 36.9 (20 Dec 2023 23:34)  T(F): 98.4 (21 Dec 2023 04:30), Max: 98.4 (20 Dec 2023 23:34)  HR: 43 (21 Dec 2023 04:30) (43 - 100)  BP: 138/88 (21 Dec 2023 04:30) (119/82 - 138/88)  BP(mean): --  RR: 19 (21 Dec 2023 04:30) (17 - 19)  SpO2: 96% (21 Dec 2023 04:30) (96% - 100%)    Parameters below as of 21 Dec 2023 04:30  Patient On (Oxygen Delivery Method): room air

## 2023-12-21 NOTE — CONSULT NOTE ADULT - ASSESSMENT
29 y/o female with PMH of EtOH abuse, IDDM, congenital heart block was brought to the ED for seizure.  Has h/o heavy alcohol use, 2 pints of Vodka daily    She has no vomiting, abdominal pain, change/incontinence in bowel/urinary habit, fever,   chills, chest pain, shortness of breath, cough, sick contact.  Has h/o IDDM , on admission a1c of 8.4% , at home on insulin lantus 16 units daily, also TG >4400 on admission.    High TG levels;  since TG levels very high at this point, will recommend to start her on insulin gtt to help bring her TG down.  -Aggressive hydration.  - agree with starting fenofibrate and  fish oil, due to young child bearing age statins for long term not good idea  -Diet modification with alcohol abstinence is very important.  -Low fat diabetic diet.    DM type 1:  -for now to start her on insulin gtt for high TG.  -Monitor sugars closely while on gtt, q1hr.  -Diabetic diet.      Seizure likely due to alcohol withdrawal   -[facility] protocol   -Folic acid  and Thiamine  -Continue  hydration    -Aspiration/seizure precaution         Hyponatremia   Na: 118, repeat 129  Monitor BMP     Hypocalcemia   supplement  as needed  Monitor ca        27 y/o female with PMH of EtOH abuse, IDDM, congenital heart block was brought to the ED for seizure.  Has h/o heavy alcohol use, 2 pints of Vodka daily    She has no vomiting, abdominal pain, change/incontinence in bowel/urinary habit, fever,   chills, chest pain, shortness of breath, cough, sick contact.  Has h/o IDDM , on admission a1c of 8.4% , at home on insulin lantus 16 units daily, also TG >4400 on admission.    High TG levels;  since TG levels very high at this point, will recommend to start her on insulin gtt to help bring her TG down.  -Aggressive hydration.  - agree with starting fenofibrate and  fish oil, due to young child bearing age statins for long term not good idea  -Diet modification with alcohol abstinence is very important.  -Low fat diabetic diet.    DM type 1:  -for now to start her on insulin gtt for high TG.  -Monitor sugars closely while on gtt, q1hr.  -Diabetic diet.      Seizure likely due to alcohol withdrawal   -[facility] protocol   -Folic acid  and Thiamine  -Continue  hydration    -Aspiration/seizure precaution         Hyponatremia   Na: 118, repeat 129  Monitor BMP     Hypocalcemia   supplement  as needed  Monitor ca        27 y/o female with PMH of EtOH abuse, IDDM, congenital heart block was brought to the ED for seizure.  Has h/o heavy alcohol use, 2 pints of Vodka daily    She has no vomiting, abdominal pain, change/incontinence in bowel/urinary habit, fever,   chills, chest pain, shortness of breath, cough, sick contact.  Has h/o IDDM , on admission a1c of 8.4% , at home on insulin basaglar  15 units daily and fiasp 1 for 15 gm cho with each meal, also TG  very high opn admission>4400 .    High TG levels;  since TG levels very high at this point, will recommend to start her on insulin gtt  to help bring her TG down.  -Start her ion insulin gtt per hyperglycemia protocol.  -Aggressive hydration.  - agree with starting fenofibrate and  fish oil, due to young child bearing age statins for long term not good idea  -Diet modification with alcohol abstinence is very important.  -Low fat diabetic diet.    DM type 1:  -for now to start her on insulin gtt for high TG, once TG start coming down below 1000 then can transition to basal bolus.  -Monitor sugars closely while on insulin gtt, q1hr.  -Diabetic diet.      Seizure likely due to alcohol withdrawal   -CIWA protocol   -Folic acid  and Thiamine  -Continue  hydration    -Aspiration/seizure precaution         Hyponatremia   Na: 118, repeat 129  Monitor BMP     Hypocalcemia   supplement  as needed  Monitor ca        29 y/o female with PMH of EtOH abuse, IDDM, congenital heart block was brought to the ED for seizure.  Has h/o heavy alcohol use, 2 pints of Vodka daily    She has no vomiting, abdominal pain, change/incontinence in bowel/urinary habit, fever,   chills, chest pain, shortness of breath, cough, sick contact.  Has h/o IDDM , on admission a1c of 8.4% , at home on insulin basaglar  15 units daily and fiasp 1 for 15 gm cho with each meal, also TG  very high opn admission>4400 .    High TG levels;  since TG levels very high at this point, will recommend to start her on insulin gtt  to help bring her TG down.  -Start her ion insulin gtt per hyperglycemia protocol.  -Aggressive hydration.  - agree with starting fenofibrate and  fish oil, due to young child bearing age statins for long term not good idea  -Diet modification with alcohol abstinence is very important.  -Low fat diabetic diet.    DM type 1:  -for now to start her on insulin gtt for high TG, once TG start coming down below 1000 then can transition to basal bolus.  -Monitor sugars closely while on insulin gtt, q1hr.  -Diabetic diet.      Seizure likely due to alcohol withdrawal   -CIWA protocol   -Folic acid  and Thiamine  -Continue  hydration    -Aspiration/seizure precaution         Hyponatremia   Na: 118, repeat 129  Monitor BMP     Hypocalcemia   supplement  as needed  Monitor ca

## 2023-12-21 NOTE — H&P ADULT - HISTORY OF PRESENT ILLNESS
29 y/o female with PMH of EtOH abuse, IDDM, congenital heart block was brought to the ED for seizure. Patient reported blurry vision before the seizure; witnessed by father (as reported by ED-father said she was shaking all over, then became stiff and blue lasted for several minutes). Patient with prior seizure in September, was started on Gabapentin 100mg tid pending MRI and neurology consult by PCP. Patient drinks 2 pints of Vodka daily (> 10 years); she is trying to cut down. She noted HA, tongue bite and nausea.  She has no vomiting, abdominal pain, change/incontinence in bowel/urinary habit, fever, chills, chest pain, shortness of breath, cough, sick contact. Lives in [place], visiting parent in [place]. Found to have hypoglycemia (FS: 50)  by EMS

## 2023-12-21 NOTE — H&P ADULT - NSICDXPASTMEDICALHX_GEN_ALL_CORE_FT
PAST MEDICAL HISTORY:  Alcohol abuse     Type 2 diabetes mellitus without complication, with long-term current use of insulin

## 2023-12-21 NOTE — CONSULT NOTE ADULT - ASSESSMENT
27 y/o female w/PMHx of Alcohol Use Disorder, IDDM on lantus and sliding scale, congenital heart block originally presenting to the ER yesterday evening after a witnessed seizure. Endocrinology recommending insulin infusion to treat her hypertriglyceridemia, MICU consulted due to need for infusion.    #Hypertriglyceridemia  - Insulin infusion at 20 units per hour   - q1h blood glucose checks  - D10 IVF at 75cc/hr to counteract low blood sugars from insulin gtt  - Fenofibrate and fish oil   - Diet modification and alcohol abstinence discussed    #Seizure  - Likely secondary to alcohol withdrawal, although hypoglycemia may have also played a role  - [facility] protocol  - On exam patient not tremors, endorsing hallucinations or tactile sensations, hemodynamically stable  - Aspiration/seizure precautions  - IVF hydration with D10, switch to LR when insulin infusion completed  - Folic Acid and Thiamine    #Lactic Acidosis  - Recheck lactate in PM after IVF restarted.    #T1DM  - Insulin gtt for now  - 15U Lantus w/sliding scale after d/c  - q1h sugars    #Hyponatremia  - Seems to have resolved, repeat Na 129  - Monitor with daily metabolic panels. 27 y/o female w/PMHx of Alcohol Use Disorder, IDDM on lantus and sliding scale, congenital heart block originally presenting to the ER yesterday evening after a witnessed seizure. Endocrinology recommending insulin infusion to treat her hypertriglyceridemia, MICU consulted due to need for infusion.    #Hypertriglyceridemia  - Insulin infusion at 20 units per hour   - q1h blood glucose checks  - D10 IVF at 75cc/hr to counteract low blood sugars from insulin gtt  - Fenofibrate and fish oil   - Diet modification and alcohol abstinence discussed    #Seizure  - Likely secondary to alcohol withdrawal, although hypoglycemia may have also played a role  - [facility] protocol  - On exam patient not tremors, endorsing hallucinations or tactile sensations, hemodynamically stable  - Aspiration/seizure precautions  - IVF hydration with D10, switch to LR when insulin infusion completed  - Folic Acid and Thiamine    #Lactic Acidosis  - Recheck lactate in PM after IVF restarted.    #T1DM  - Insulin gtt for now  - 15U Lantus w/sliding scale after d/c  - q1h sugars    #Hyponatremia  - Seems to have resolved, repeat Na 129  - Monitor with daily metabolic panels.

## 2023-12-21 NOTE — H&P ADULT - NSICDXFAMHXNEG_GEN_ALL
See TE, what diagnosis should be associated with the tramadol. I pended it with osteoarthritis and other chronic pain, do you think that will be sufficient? diabetes

## 2023-12-21 NOTE — CONSULT NOTE ADULT - SUBJECTIVE AND OBJECTIVE BOX
Patient is a 28y old  Female who presents with a chief complaint of seizure  HPI:  29 y/o female with PMH of EtOH abuse, IDDM, congenital heart block was brought to the ED for seizure. Patient reported blurry vision before the seizure;   witnessed by father (as reported by ED-father said she was shaking all over, then became stiff and blue lasted for several minutes).   Patient with prior seizure in September, was started on Gabapentin 100mg tid pending MRI and neurology consult by PCP. Patient drinks 2 pints of Vodka daily   (> 10 years); she is trying to cut down. She noted HA, tongue bite and nausea.  She has no vomiting, abdominal pain, change/incontinence in bowel/urinary habit, fever,   chills, chest pain, shortness of breath, cough, sick contact. Lives in [place], visiting parent in [place]. Found to have hypoglycemia (FS: 50)  by EMS   Has h/o IDDM , on admission a1c of 8.4% , at home on insulin lantus 16 units daily, also TG >4400 on admission.      PAST MEDICAL & SURGICAL HISTORY:  Type 2 diabetes mellitus without complication, with long-term current use of insulin  Alcohol abuse        Social History:  No cigarette, uses marijuana occasionally, lives with family. (21 Dec 2023 04:59)      FAMILY HISTORY:  No pertinent family history in first degree relatives          Allergies    No Known Allergies    Intolerances        REVIEW OF SYSTEMS:    CONSTITUTIONAL: No fever, weight loss, or fatigue  EYES: No eye pain, visual disturbances, or discharge  ENMT:  No difficulty hearing, tinnitus, vertigo; No sinus or throat pain  NECK: No pain or stiffness  RESPIRATORY: No cough, wheezing, chills or hemoptysis; No shortness of breath  CARDIOVASCULAR: No chest pain, palpitations, dizziness, or leg swelling  GASTROINTESTINAL: No abdominal or epigastric pain. No nausea, vomiting, or hematemesis;   No diarrhea or constipation. No melena or hematochezia.  NEUROLOGICAL: No headaches, memory loss, loss of strength, numbness, or tremors  SKIN: No itching, burning, rashes, or lesions   MUSCULOSKELETAL: No joint pain or swelling; No muscle, back, or extremity pain  PSYCHIATRIC: No depression, anxiety, mood swings, or difficulty sleeping        MEDICATIONS  (STANDING):  dextrose 5%. 1000 milliLiter(s) (50 mL/Hr) IV Continuous <Continuous>  dextrose 5%. 1000 milliLiter(s) (100 mL/Hr) IV Continuous <Continuous>  dextrose 50% Injectable 25 Gram(s) IV Push once  dextrose 50% Injectable 25 Gram(s) IV Push once  dextrose 50% Injectable 12.5 Gram(s) IV Push once  fenofibrate Tablet 145 milliGRAM(s) Oral daily  folic acid 1 milliGRAM(s) Oral daily  gabapentin 100 milliGRAM(s) Oral three times a day  glucagon  Injectable 1 milliGRAM(s) IntraMuscular once  influenza   Vaccine 0.5 milliLiter(s) IntraMuscular once  insulin glargine Injectable (LANTUS) 10 Unit(s) SubCutaneous at bedtime  insulin lispro (ADMELOG) corrective regimen sliding scale   SubCutaneous three times a day before meals  insulin lispro (ADMELOG) corrective regimen sliding scale   SubCutaneous at bedtime  LORazepam     Tablet   Oral   LORazepam     Tablet 2 milliGRAM(s) Oral every 4 hours  multivitamin 1 Tablet(s) Oral daily  omega-3-Acid Ethyl Esters 2 Gram(s) Oral two times a day  sodium chloride 0.9%. 1000 milliLiter(s) (80 mL/Hr) IV Continuous <Continuous>  thiamine 100 milliGRAM(s) Oral daily    MEDICATIONS  (PRN):  acetaminophen     Tablet .. 650 milliGRAM(s) Oral every 6 hours PRN Temp greater or equal to 38C (100.4F), Mild Pain (1 - 3)  aluminum hydroxide/magnesium hydroxide/simethicone Suspension 30 milliLiter(s) Oral every 4 hours PRN Dyspepsia  dextrose Oral Gel 15 Gram(s) Oral once PRN Blood Glucose LESS THAN 70 milliGRAM(s)/deciliter  LORazepam   Injectable 2 milliGRAM(s) IV Push every 1 hour PRN Symptom-triggered: each CIWA -Ar score 8 or GREATER  melatonin 3 milliGRAM(s) Oral at bedtime PRN Insomnia  ondansetron Injectable 4 milliGRAM(s) IV Push every 8 hours PRN Nausea and/or Vomiting      Vital Signs Last 24 Hrs  T(C): 36.9 (21 Dec 2023 11:18), Max: 36.9 (20 Dec 2023 23:34)  T(F): 98.5 (21 Dec 2023 11:18), Max: 98.5 (21 Dec 2023 10:00)  HR: 92 (21 Dec 2023 11:18) (41 - 106)  BP: 120/83 (21 Dec 2023 11:18) (119/82 - 145/98)  BP(mean): --  RR: 18 (21 Dec 2023 11:18) (17 - 19)  SpO2: 97% (21 Dec 2023 11:18) (94% - 100%)    Parameters below as of 21 Dec 2023 11:18  Patient On (Oxygen Delivery Method): room air          PHYSICAL EXAM:    Constitutional: NAD, well-groomed, well-developed  HEENT: PERRL,  no exophalmos  Neck: No LAD, , no thyroid enlargement  Respiratory: CTAB  Cardiovascular: S1 and S2, RRR, no M/G/R  Gastrointestinal: BS+, soft, no organomegaly  Extremities: No peripheral edema, no pedal lesions  Vascular: 2+ peripheral pulses  Neurological: A/O x 3, no focal deficits  Psychiatric: Normal mood, normal affect  Skin: No rashes, no acanthosis        LABS  12-21    124<L>  |  89<L>  |  8.9  ----------------------------<  156<H>  3.6   |  23.0  |  0.51    Ca    8.3<L>      21 Dec 2023 08:00  Mg     1.8     12-21    TPro  4.9<L>  /  Alb  2.2<L>  /  TBili  2.0  /  DBili  x   /  AST  171<H>  /  ALT  50<H>  /  AlkPhos  123<H>  12-21                          9.4    7.14  )-----------( 143      ( 21 Dec 2023 08:00 )             25.8       A1C with Estimated Average Glucose Result: 8.4 % (12-21-23 @ 08:00)          Alanine Aminotransferase (ALT/SGPT): 50 U/L (12-21-23 @ 08:00)  Aspartate Aminotransferase (AST/SGOT): 171 U/L (12-21-23 @ 08:00)  Albumin: 2.2 g/dL (12-21-23 @ 08:00)  Alkaline Phosphatase: 123 U/L (12-21-23 @ 08:00)  Albumin: 2.0 g/dL (12-20-23 @ 21:53)  Aspartate Aminotransferase (AST/SGOT): 96 U/L (12-20-23 @ 21:53)  Alanine Aminotransferase (ALT/SGPT): see note U/L (12-20-23 @ 21:53)  Alkaline Phosphatase: 87 U/L (12-20-23 @ 21:53)  Alkaline Phosphatase: 124 U/L (12-20-23 @ 18:12)  Albumin: 2.8 g/dL (12-20-23 @ 18:12)  Alanine Aminotransferase (ALT/SGPT): SeeComment Unable to result  Notified Dr. BROCK Jama  12/20/2023 20:19:48 EST  AB  Will send another sample U/L (12-20-23 @ 18:12)  Aspartate Aminotransferase (AST/SGOT): SeeComment Unable to result  Notified Dr. BROCK Jama  12/20/2023 20:19:48 EST  AB  Will send another sample U/L (12-20-23 @ 18:12)      CAPILLARY BLOOD GLUCOSE      POCT Blood Glucose.: 172 mg/dL (21 Dec 2023 08:54)  POCT Blood Glucose.: 198 mg/dL (21 Dec 2023 05:10)  POCT Blood Glucose.: 155 mg/dL (20 Dec 2023 16:08)       Patient is a 28y old  Female who presents with a chief complaint of seizure  HPI:  29 y/o female with PMH of EtOH abuse, IDDM, congenital heart block was brought to the ED for seizure. Patient reported blurry vision before the seizure;   witnessed by father (as reported by ED-father said she was shaking all over, then became stiff and blue lasted for several minutes).   Patient with prior seizure in September, was started on Gabapentin 100mg tid pending MRI and neurology consult by PCP. Patient drinks 2 pints of Vodka daily   (> 10 years); she is trying to cut down. She noted HA, tongue bite and nausea.  She has no vomiting, abdominal pain, change/incontinence in bowel/urinary habit, fever,   chills, chest pain, shortness of breath, cough, sick contact. Lives in [place], visiting parent in [place]. Found to have hypoglycemia (FS: 50)  by EMS   Has h/o IDDM , on admission a1c of 8.4% , at home on insulin basaglar  15 units daily and fiasp 1 for 15 gm cho with each meal, also TG  very high on admission>4400 .  She tells me she has not had high TG in past, no family h/o lipid disorders or heart attack  at young age that she is aware of.  Admits to not eating right, was eating a lot of junk food.      PAST MEDICAL & SURGICAL HISTORY:  Type 2 diabetes mellitus without complication, with long-term current use of insulin  Alcohol abuse        Social History:  No cigarette, uses marijuana occasionally, lives with family. (21 Dec 2023 04:59)      FAMILY HISTORY:  No pertinent family history in first degree relatives          Allergies    No Known Allergies        REVIEW OF SYSTEMS:    CONSTITUTIONAL: No fever, weight loss, + fatigue and tired  EYES: No eye pain, visual disturbances, or discharge  ENMT:  No difficulty hearing, tinnitus, vertigo; No sinus or throat pain  NECK: No pain or stiffness  RESPIRATORY: No cough, wheezing, chills or hemoptysis; No shortness of breath  CARDIOVASCULAR: No chest pain, palpitations, dizziness, or leg swelling  GASTROINTESTINAL: No abdominal or epigastric pain. No nausea, vomiting.  No diarrhea or constipation. No melena or hematochezia.  NEUROLOGICAL: No headaches, memory loss, loss of strength, numbness, or tremors  SKIN: No itching, burning, rashes, or lesions   MUSCULOSKELETAL: No joint pain or swelling; No muscle, back, or extremity pain      MEDICATIONS  (STANDING):  dextrose 5%. 1000 milliLiter(s) (50 mL/Hr) IV Continuous <Continuous>  dextrose 5%. 1000 milliLiter(s) (100 mL/Hr) IV Continuous <Continuous>  dextrose 50% Injectable 25 Gram(s) IV Push once  dextrose 50% Injectable 25 Gram(s) IV Push once  dextrose 50% Injectable 12.5 Gram(s) IV Push once  fenofibrate Tablet 145 milliGRAM(s) Oral daily  folic acid 1 milliGRAM(s) Oral daily  gabapentin 100 milliGRAM(s) Oral three times a day  glucagon  Injectable 1 milliGRAM(s) IntraMuscular once  influenza   Vaccine 0.5 milliLiter(s) IntraMuscular once  insulin glargine Injectable (LANTUS) 10 Unit(s) SubCutaneous at bedtime  insulin lispro (ADMELOG) corrective regimen sliding scale   SubCutaneous three times a day before meals  insulin lispro (ADMELOG) corrective regimen sliding scale   SubCutaneous at bedtime  LORazepam     Tablet   Oral   LORazepam     Tablet 2 milliGRAM(s) Oral every 4 hours  multivitamin 1 Tablet(s) Oral daily  omega-3-Acid Ethyl Esters 2 Gram(s) Oral two times a day  sodium chloride 0.9%. 1000 milliLiter(s) (80 mL/Hr) IV Continuous <Continuous>  thiamine 100 milliGRAM(s) Oral daily    MEDICATIONS  (PRN):  acetaminophen     Tablet .. 650 milliGRAM(s) Oral every 6 hours PRN Temp greater or equal to 38C (100.4F), Mild Pain (1 - 3)  aluminum hydroxide/magnesium hydroxide/simethicone Suspension 30 milliLiter(s) Oral every 4 hours PRN Dyspepsia  dextrose Oral Gel 15 Gram(s) Oral once PRN Blood Glucose LESS THAN 70 milliGRAM(s)/deciliter  LORazepam   Injectable 2 milliGRAM(s) IV Push every 1 hour PRN Symptom-triggered: each CIWA -Ar score 8 or GREATER  melatonin 3 milliGRAM(s) Oral at bedtime PRN Insomnia  ondansetron Injectable 4 milliGRAM(s) IV Push every 8 hours PRN Nausea and/or Vomiting      Vital Signs Last 24 Hrs  T(C): 36.9 (21 Dec 2023 11:18), Max: 36.9 (20 Dec 2023 23:34)  T(F): 98.5 (21 Dec 2023 11:18), Max: 98.5 (21 Dec 2023 10:00)  HR: 92 (21 Dec 2023 11:18) (41 - 106)  BP: 120/83 (21 Dec 2023 11:18) (119/82 - 145/98)  BP(mean): --  RR: 18 (21 Dec 2023 11:18) (17 - 19)  SpO2: 97% (21 Dec 2023 11:18) (94% - 100%)    Parameters below as of 21 Dec 2023 11:18  Patient On (Oxygen Delivery Method): room air          PHYSICAL EXAM:    Constitutional: NAD, well-groomed, well-developed  HEENT: PERRL,  no exophalmos  Neck: No LAD,  no thyroid enlargement  Respiratory: CTAB  Cardiovascular: S1 and S2, RRR, no M/G/R  Gastrointestinal: BS+, soft, no organomegaly  Extremities: No peripheral edema, no pedal lesions  Vascular: 2+ peripheral pulses  Neurological: A/O x 3, no focal deficits  Psychiatric: Normal mood, normal affect  Skin: No rashes, no acanthosis        LABS  12-21    124<L>  |  89<L>  |  8.9  ----------------------------<  156<H>  3.6   |  23.0  |  0.51    Ca    8.3<L>      21 Dec 2023 08:00  Mg     1.8     12-21    TPro  4.9<L>  /  Alb  2.2<L>  /  TBili  2.0  /  DBili  x   /  AST  171<H>  /  ALT  50<H>  /  AlkPhos  123<H>  12-21                          9.4    7.14  )-----------( 143      ( 21 Dec 2023 08:00 )             25.8       A1C with Estimated Average Glucose Result: 8.4 % (12-21-23 @ 08:00)          Alanine Aminotransferase (ALT/SGPT): 50 U/L (12-21-23 @ 08:00)  Aspartate Aminotransferase (AST/SGOT): 171 U/L (12-21-23 @ 08:00)  Albumin: 2.2 g/dL (12-21-23 @ 08:00)  Alkaline Phosphatase: 123 U/L (12-21-23 @ 08:00)  Albumin: 2.0 g/dL (12-20-23 @ 21:53)  Aspartate Aminotransferase (AST/SGOT): 96 U/L (12-20-23 @ 21:53)  Alanine Aminotransferase (ALT/SGPT): see note U/L (12-20-23 @ 21:53)  Alkaline Phosphatase: 87 U/L (12-20-23 @ 21:53)  Alkaline Phosphatase: 124 U/L (12-20-23 @ 18:12)  Albumin: 2.8 g/dL (12-20-23 @ 18:12)  Alanine Aminotransferase (ALT/SGPT): SeeComment Unable to result  Notified Dr. BROCK Jama  12/20/2023 20:19:48 EST  AB  Will send another sample U/L (12-20-23 @ 18:12)  Aspartate Aminotransferase (AST/SGOT): SeeComment Unable to result  Notified Dr. BROCK Jama  12/20/2023 20:19:48 EST  AB  Will send another sample U/L (12-20-23 @ 18:12)      CAPILLARY BLOOD GLUCOSE      POCT Blood Glucose.: 172 mg/dL (21 Dec 2023 08:54)  POCT Blood Glucose.: 198 mg/dL (21 Dec 2023 05:10)  POCT Blood Glucose.: 155 mg/dL (20 Dec 2023 16:08)       Patient is a 28y old  Female who presents with a chief complaint of seizure  HPI:  27 y/o female with PMH of EtOH abuse, IDDM, congenital heart block was brought to the ED for seizure. Patient reported blurry vision before the seizure;   witnessed by father (as reported by ED-father said she was shaking all over, then became stiff and blue lasted for several minutes).   Patient with prior seizure in September, was started on Gabapentin 100mg tid pending MRI and neurology consult by PCP. Patient drinks 2 pints of Vodka daily   (> 10 years); she is trying to cut down. She noted HA, tongue bite and nausea.  She has no vomiting, abdominal pain, change/incontinence in bowel/urinary habit, fever,   chills, chest pain, shortness of breath, cough, sick contact. Lives in [place], visiting parent in [place]. Found to have hypoglycemia (FS: 50)  by EMS   Has h/o IDDM , on admission a1c of 8.4% , at home on insulin basaglar  15 units daily and fiasp 1 for 15 gm cho with each meal, also TG  very high on admission>4400 .  She tells me she has not had high TG in past, no family h/o lipid disorders or heart attack  at young age that she is aware of.  Admits to not eating right, was eating a lot of junk food.      PAST MEDICAL & SURGICAL HISTORY:  Type 2 diabetes mellitus without complication, with long-term current use of insulin  Alcohol abuse        Social History:  No cigarette, uses marijuana occasionally, lives with family. (21 Dec 2023 04:59)      FAMILY HISTORY:  No pertinent family history in first degree relatives          Allergies    No Known Allergies        REVIEW OF SYSTEMS:    CONSTITUTIONAL: No fever, weight loss, + fatigue and tired  EYES: No eye pain, visual disturbances, or discharge  ENMT:  No difficulty hearing, tinnitus, vertigo; No sinus or throat pain  NECK: No pain or stiffness  RESPIRATORY: No cough, wheezing, chills or hemoptysis; No shortness of breath  CARDIOVASCULAR: No chest pain, palpitations, dizziness, or leg swelling  GASTROINTESTINAL: No abdominal or epigastric pain. No nausea, vomiting.  No diarrhea or constipation. No melena or hematochezia.  NEUROLOGICAL: No headaches, memory loss, loss of strength, numbness, or tremors  SKIN: No itching, burning, rashes, or lesions   MUSCULOSKELETAL: No joint pain or swelling; No muscle, back, or extremity pain      MEDICATIONS  (STANDING):  dextrose 5%. 1000 milliLiter(s) (50 mL/Hr) IV Continuous <Continuous>  dextrose 5%. 1000 milliLiter(s) (100 mL/Hr) IV Continuous <Continuous>  dextrose 50% Injectable 25 Gram(s) IV Push once  dextrose 50% Injectable 25 Gram(s) IV Push once  dextrose 50% Injectable 12.5 Gram(s) IV Push once  fenofibrate Tablet 145 milliGRAM(s) Oral daily  folic acid 1 milliGRAM(s) Oral daily  gabapentin 100 milliGRAM(s) Oral three times a day  glucagon  Injectable 1 milliGRAM(s) IntraMuscular once  influenza   Vaccine 0.5 milliLiter(s) IntraMuscular once  insulin glargine Injectable (LANTUS) 10 Unit(s) SubCutaneous at bedtime  insulin lispro (ADMELOG) corrective regimen sliding scale   SubCutaneous three times a day before meals  insulin lispro (ADMELOG) corrective regimen sliding scale   SubCutaneous at bedtime  LORazepam     Tablet   Oral   LORazepam     Tablet 2 milliGRAM(s) Oral every 4 hours  multivitamin 1 Tablet(s) Oral daily  omega-3-Acid Ethyl Esters 2 Gram(s) Oral two times a day  sodium chloride 0.9%. 1000 milliLiter(s) (80 mL/Hr) IV Continuous <Continuous>  thiamine 100 milliGRAM(s) Oral daily    MEDICATIONS  (PRN):  acetaminophen     Tablet .. 650 milliGRAM(s) Oral every 6 hours PRN Temp greater or equal to 38C (100.4F), Mild Pain (1 - 3)  aluminum hydroxide/magnesium hydroxide/simethicone Suspension 30 milliLiter(s) Oral every 4 hours PRN Dyspepsia  dextrose Oral Gel 15 Gram(s) Oral once PRN Blood Glucose LESS THAN 70 milliGRAM(s)/deciliter  LORazepam   Injectable 2 milliGRAM(s) IV Push every 1 hour PRN Symptom-triggered: each CIWA -Ar score 8 or GREATER  melatonin 3 milliGRAM(s) Oral at bedtime PRN Insomnia  ondansetron Injectable 4 milliGRAM(s) IV Push every 8 hours PRN Nausea and/or Vomiting      Vital Signs Last 24 Hrs  T(C): 36.9 (21 Dec 2023 11:18), Max: 36.9 (20 Dec 2023 23:34)  T(F): 98.5 (21 Dec 2023 11:18), Max: 98.5 (21 Dec 2023 10:00)  HR: 92 (21 Dec 2023 11:18) (41 - 106)  BP: 120/83 (21 Dec 2023 11:18) (119/82 - 145/98)  BP(mean): --  RR: 18 (21 Dec 2023 11:18) (17 - 19)  SpO2: 97% (21 Dec 2023 11:18) (94% - 100%)    Parameters below as of 21 Dec 2023 11:18  Patient On (Oxygen Delivery Method): room air          PHYSICAL EXAM:    Constitutional: NAD, well-groomed, well-developed  HEENT: PERRL,  no exophalmos  Neck: No LAD,  no thyroid enlargement  Respiratory: CTAB  Cardiovascular: S1 and S2, RRR, no M/G/R  Gastrointestinal: BS+, soft, no organomegaly  Extremities: No peripheral edema, no pedal lesions  Vascular: 2+ peripheral pulses  Neurological: A/O x 3, no focal deficits  Psychiatric: Normal mood, normal affect  Skin: No rashes, no acanthosis        LABS  12-21    124<L>  |  89<L>  |  8.9  ----------------------------<  156<H>  3.6   |  23.0  |  0.51    Ca    8.3<L>      21 Dec 2023 08:00  Mg     1.8     12-21    TPro  4.9<L>  /  Alb  2.2<L>  /  TBili  2.0  /  DBili  x   /  AST  171<H>  /  ALT  50<H>  /  AlkPhos  123<H>  12-21                          9.4    7.14  )-----------( 143      ( 21 Dec 2023 08:00 )             25.8       A1C with Estimated Average Glucose Result: 8.4 % (12-21-23 @ 08:00)          Alanine Aminotransferase (ALT/SGPT): 50 U/L (12-21-23 @ 08:00)  Aspartate Aminotransferase (AST/SGOT): 171 U/L (12-21-23 @ 08:00)  Albumin: 2.2 g/dL (12-21-23 @ 08:00)  Alkaline Phosphatase: 123 U/L (12-21-23 @ 08:00)  Albumin: 2.0 g/dL (12-20-23 @ 21:53)  Aspartate Aminotransferase (AST/SGOT): 96 U/L (12-20-23 @ 21:53)  Alanine Aminotransferase (ALT/SGPT): see note U/L (12-20-23 @ 21:53)  Alkaline Phosphatase: 87 U/L (12-20-23 @ 21:53)  Alkaline Phosphatase: 124 U/L (12-20-23 @ 18:12)  Albumin: 2.8 g/dL (12-20-23 @ 18:12)  Alanine Aminotransferase (ALT/SGPT): SeeComment Unable to result  Notified Dr. BROCK Jama  12/20/2023 20:19:48 EST  AB  Will send another sample U/L (12-20-23 @ 18:12)  Aspartate Aminotransferase (AST/SGOT): SeeComment Unable to result  Notified Dr. BROCK Jama  12/20/2023 20:19:48 EST  AB  Will send another sample U/L (12-20-23 @ 18:12)      CAPILLARY BLOOD GLUCOSE      POCT Blood Glucose.: 172 mg/dL (21 Dec 2023 08:54)  POCT Blood Glucose.: 198 mg/dL (21 Dec 2023 05:10)  POCT Blood Glucose.: 155 mg/dL (20 Dec 2023 16:08)

## 2023-12-21 NOTE — CONSULT NOTE ADULT - SUBJECTIVE AND OBJECTIVE BOX
27 y/o female w/PMHx of Alcohol Use Disorder, IDDM on lantus and sliding scale, congenital heart block originally presenting to the ER yesterday evening after a witnessed seizure. Per pt she felt blurry vision around 4PM and the next thing she remembered was waking up in the ambulance. Pt states she felt her blood sugar was low, has had one other seizure in the past that she thinks was related to low blood sugar. Of note, patient drinks two pints of vodka daily but has been trying to cut down (intermittent shots throughout the day to avoid cold turkey drinking with her last drink the same day as her reported seizure. Takes 15 units of long acting insulin daily around 10:30AM and sliding scale before or after her meals. No other medical problems besides alcohol use and diabetes, is currently living in [place] but from [place] and was visiting family.    Per chart review, pt had a fingerstick of 50 with EMS, was hyponatremic to 118, elevated lactic acid 5.20, fingerstick of 155, and triglycerides greater than 4425. Pt admits to eating relatively indiscriminately of late, no known personal or family history of cholesterol problems.    Endocrinology recommending insulin infusion to treat her hypertriglyceridemia, MICU consulted due to need for infusion.      PAST MEDICAL & SURGICAL HISTORY:  Type 2 diabetes mellitus without complication, with long-term current use of insulin  Alcohol abuse      Allergies  No Known Allergies  Intolerances      FAMILY HISTORY:  No pertinent family history in first degree relatives    Family history otherwise noncontributory.    Social History: 2pints vodka daily, non smoker    Review of Systems:  CONSTITUTIONAL:  No fevers, chills  HEAD: No headache  EYES: No blurry vision  ENT: No epistaxis, rhinorrhea, sore throat  CARDIOVASCULAR:  No chest pain, no palpitations  RESPIRATORY:  As per HPI  GASTROINTESTINAL:  No abdominal pain, N/V/D  GENITOURINARY:  No dysuria, frequency or urgency  NEUROLOGIC:  +seizure, no headaches  EXTREMITIES: No leg swelling  PSYCHIATRIC:  No disorder of thought or mood    ALL OTHER REVIEW OF SYSTEMS EXCEPT PER HPI NEGATIVE.      Medications:    acetaminophen     Tablet .. 650 milliGRAM(s) Oral every 6 hours PRN  gabapentin 100 milliGRAM(s) Oral three times a day  LORazepam     Tablet 2 milliGRAM(s) Oral every 4 hours  LORazepam     Tablet   Oral   LORazepam   Injectable 2 milliGRAM(s) IV Push every 1 hour PRN  melatonin 3 milliGRAM(s) Oral at bedtime PRN  ondansetron Injectable 4 milliGRAM(s) IV Push every 8 hours PRN    aluminum hydroxide/magnesium hydroxide/simethicone Suspension 30 milliLiter(s) Oral every 4 hours PRN    dextrose 50% Injectable 25 Gram(s) IV Push once  dextrose 50% Injectable 25 Gram(s) IV Push once  dextrose 50% Injectable 12.5 Gram(s) IV Push once  dextrose Oral Gel 15 Gram(s) Oral once PRN  fenofibrate Tablet 145 milliGRAM(s) Oral daily  glucagon  Injectable 1 milliGRAM(s) IntraMuscular once  insulin regular Infusion 20 Unit(s)/Hr IV Continuous <Continuous>    dextrose 10%. 1000 milliLiter(s) IV Continuous <Continuous>  folic acid 1 milliGRAM(s) Oral daily  multivitamin 1 Tablet(s) Oral daily  thiamine 100 milliGRAM(s) Oral daily    influenza   Vaccine 0.5 milliLiter(s) IntraMuscular once      omega-3-Acid Ethyl Esters 2 Gram(s) Oral two times a day          ICU Vital Signs Last 24 Hrs  T(C): 36.9 (21 Dec 2023 11:18), Max: 36.9 (20 Dec 2023 23:34)  T(F): 98.5 (21 Dec 2023 11:18), Max: 98.5 (21 Dec 2023 10:00)  HR: 92 (21 Dec 2023 11:18) (41 - 106)  BP: 120/83 (21 Dec 2023 11:18) (119/82 - 145/98)  BP(mean): --  ABP: --  ABP(mean): --  RR: 18 (21 Dec 2023 11:18) (17 - 19)  SpO2: 97% (21 Dec 2023 11:18) (94% - 100%)    O2 Parameters below as of 21 Dec 2023 11:18  Patient On (Oxygen Delivery Method): room air          Vital Signs Last 24 Hrs  T(C): 36.9 (21 Dec 2023 11:18), Max: 36.9 (20 Dec 2023 23:34)  T(F): 98.5 (21 Dec 2023 11:18), Max: 98.5 (21 Dec 2023 10:00)  HR: 92 (21 Dec 2023 11:18) (41 - 106)  BP: 120/83 (21 Dec 2023 11:18) (119/82 - 145/98)  BP(mean): --  RR: 18 (21 Dec 2023 11:18) (17 - 19)  SpO2: 97% (21 Dec 2023 11:18) (94% - 100%)    Parameters below as of 21 Dec 2023 11:18  Patient On (Oxygen Delivery Method): room air            I&O's Detail        LABS:                        9.4    7.14  )-----------( 143      ( 21 Dec 2023 08:00 )             25.8     12-21    124<L>  |  89<L>  |  8.9  ----------------------------<  156<H>  3.6   |  23.0  |  0.51    Ca    8.3<L>      21 Dec 2023 08:00  Mg     1.8     12-21    TPro  4.9<L>  /  Alb  2.2<L>  /  TBili  2.0  /  DBili  x   /  AST  171<H>  /  ALT  50<H>  /  AlkPhos  123<H>  12-21      CARDIAC MARKERS ( 20 Dec 2023 18:12 )  x     / x     / 173 U/L / x     / 2.7 ng/mL      CAPILLARY BLOOD GLUCOSE      POCT Blood Glucose.: 327 mg/dL (21 Dec 2023 12:48)      Urinalysis Basic - ( 21 Dec 2023 08:00 )    Color: x / Appearance: x / SG: x / pH: x  Gluc: 156 mg/dL / Ketone: x  / Bili: x / Urobili: x   Blood: x / Protein: x / Nitrite: x   Leuk Esterase: x / RBC: x / WBC x   Sq Epi: x / Non Sq Epi: x / Bacteria: x    General: well appearing, NAD  Head: NC, AT  EENT: EOMI, no scleral icterus  Cardiac: RRR, no apparent murmurs, no lower extremity edema  Respiratory: CTABL, no respiratory distress   Abdomen: soft, ND, NT, nonperitonitic  MSK/Vascular: full ROM, soft compartments, warm extremities, 2+ peripheral pulses  Neuro: AAOx3, sensation to light touch intact  Psych: calm, cooperative      RADIOLOGY:     < from: Xray Chest 1 View- PORTABLE-Urgent (Xray Chest 1 View- PORTABLE-Urgent .) (12.20.23 @ 20:41) >    ACC: [id number] EXAM:  XR CHEST PORTABLE URGENT 1V   ORDERED BY: ISAURA HOROWITZ     PROCEDURE DATE:  12/20/2023          INTERPRETATION:  Portable chest radiograph    CLINICAL INFORMATION: Weakness    TECHNIQUE:  Portable  AP chest radiograph.    COMPARISON: None. .    FINDINGS:  CATHETERS AND TUBES: None    PULMONARY: The visualized lungs are clear of airspace consolidations or   pleural effusions.   No pneumothorax.    HEART/VASCULAR: The heart and mediastinum size and configuration are   within normal limits.    BONES: Visualized osseous thorax intact.    IMPRESSION:   No radiographic evidence of active chest disease.    --- End of Report ---            CONCHA MENDIETA MD; Attending Radiologist  This document has been electronically signed. Dec 21 2023  7:58AM         27 y/o female w/PMHx of Alcohol Use Disorder, IDDM on lantus and sliding scale, congenital heart block originally presenting to the ER yesterday evening after a witnessed seizure. Per pt she felt blurry vision around 4PM and the next thing she remembered was waking up in the ambulance. Pt states she felt her blood sugar was low, has had one other seizure in the past that she thinks was related to low blood sugar. Of note, patient drinks two pints of vodka daily but has been trying to cut down (intermittent shots throughout the day to avoid cold turkey drinking with her last drink the same day as her reported seizure. Takes 15 units of long acting insulin daily around 10:30AM and sliding scale before or after her meals. No other medical problems besides alcohol use and diabetes, is currently living in [place] but from [place] and was visiting family.    Per chart review, pt had a fingerstick of 50 with EMS, was hyponatremic to 118, elevated lactic acid 5.20, fingerstick of 155, and triglycerides greater than 4425. Pt admits to eating relatively indiscriminately of late, no known personal or family history of cholesterol problems.    Endocrinology recommending insulin infusion to treat her hypertriglyceridemia, MICU consulted due to need for infusion.      PAST MEDICAL & SURGICAL HISTORY:  Type 2 diabetes mellitus without complication, with long-term current use of insulin  Alcohol abuse      Allergies  No Known Allergies  Intolerances      FAMILY HISTORY:  No pertinent family history in first degree relatives    Family history otherwise noncontributory.    Social History: 2pints vodka daily, non smoker    Review of Systems:  CONSTITUTIONAL:  No fevers, chills  HEAD: No headache  EYES: No blurry vision  ENT: No epistaxis, rhinorrhea, sore throat  CARDIOVASCULAR:  No chest pain, no palpitations  RESPIRATORY:  As per HPI  GASTROINTESTINAL:  No abdominal pain, N/V/D  GENITOURINARY:  No dysuria, frequency or urgency  NEUROLOGIC:  +seizure, no headaches  EXTREMITIES: No leg swelling  PSYCHIATRIC:  No disorder of thought or mood    ALL OTHER REVIEW OF SYSTEMS EXCEPT PER HPI NEGATIVE.      Medications:    acetaminophen     Tablet .. 650 milliGRAM(s) Oral every 6 hours PRN  gabapentin 100 milliGRAM(s) Oral three times a day  LORazepam     Tablet 2 milliGRAM(s) Oral every 4 hours  LORazepam     Tablet   Oral   LORazepam   Injectable 2 milliGRAM(s) IV Push every 1 hour PRN  melatonin 3 milliGRAM(s) Oral at bedtime PRN  ondansetron Injectable 4 milliGRAM(s) IV Push every 8 hours PRN    aluminum hydroxide/magnesium hydroxide/simethicone Suspension 30 milliLiter(s) Oral every 4 hours PRN    dextrose 50% Injectable 25 Gram(s) IV Push once  dextrose 50% Injectable 25 Gram(s) IV Push once  dextrose 50% Injectable 12.5 Gram(s) IV Push once  dextrose Oral Gel 15 Gram(s) Oral once PRN  fenofibrate Tablet 145 milliGRAM(s) Oral daily  glucagon  Injectable 1 milliGRAM(s) IntraMuscular once  insulin regular Infusion 20 Unit(s)/Hr IV Continuous <Continuous>    dextrose 10%. 1000 milliLiter(s) IV Continuous <Continuous>  folic acid 1 milliGRAM(s) Oral daily  multivitamin 1 Tablet(s) Oral daily  thiamine 100 milliGRAM(s) Oral daily    influenza   Vaccine 0.5 milliLiter(s) IntraMuscular once      omega-3-Acid Ethyl Esters 2 Gram(s) Oral two times a day          ICU Vital Signs Last 24 Hrs  T(C): 36.9 (21 Dec 2023 11:18), Max: 36.9 (20 Dec 2023 23:34)  T(F): 98.5 (21 Dec 2023 11:18), Max: 98.5 (21 Dec 2023 10:00)  HR: 92 (21 Dec 2023 11:18) (41 - 106)  BP: 120/83 (21 Dec 2023 11:18) (119/82 - 145/98)  BP(mean): --  ABP: --  ABP(mean): --  RR: 18 (21 Dec 2023 11:18) (17 - 19)  SpO2: 97% (21 Dec 2023 11:18) (94% - 100%)    O2 Parameters below as of 21 Dec 2023 11:18  Patient On (Oxygen Delivery Method): room air          Vital Signs Last 24 Hrs  T(C): 36.9 (21 Dec 2023 11:18), Max: 36.9 (20 Dec 2023 23:34)  T(F): 98.5 (21 Dec 2023 11:18), Max: 98.5 (21 Dec 2023 10:00)  HR: 92 (21 Dec 2023 11:18) (41 - 106)  BP: 120/83 (21 Dec 2023 11:18) (119/82 - 145/98)  BP(mean): --  RR: 18 (21 Dec 2023 11:18) (17 - 19)  SpO2: 97% (21 Dec 2023 11:18) (94% - 100%)    Parameters below as of 21 Dec 2023 11:18  Patient On (Oxygen Delivery Method): room air            I&O's Detail        LABS:                        9.4    7.14  )-----------( 143      ( 21 Dec 2023 08:00 )             25.8     12-21    124<L>  |  89<L>  |  8.9  ----------------------------<  156<H>  3.6   |  23.0  |  0.51    Ca    8.3<L>      21 Dec 2023 08:00  Mg     1.8     12-21    TPro  4.9<L>  /  Alb  2.2<L>  /  TBili  2.0  /  DBili  x   /  AST  171<H>  /  ALT  50<H>  /  AlkPhos  123<H>  12-21      CARDIAC MARKERS ( 20 Dec 2023 18:12 )  x     / x     / 173 U/L / x     / 2.7 ng/mL      CAPILLARY BLOOD GLUCOSE      POCT Blood Glucose.: 327 mg/dL (21 Dec 2023 12:48)      Urinalysis Basic - ( 21 Dec 2023 08:00 )    Color: x / Appearance: x / SG: x / pH: x  Gluc: 156 mg/dL / Ketone: x  / Bili: x / Urobili: x   Blood: x / Protein: x / Nitrite: x   Leuk Esterase: x / RBC: x / WBC x   Sq Epi: x / Non Sq Epi: x / Bacteria: x    General: well appearing, NAD  Head: NC, AT  EENT: EOMI, no scleral icterus  Cardiac: RRR, no apparent murmurs, no lower extremity edema  Respiratory: CTABL, no respiratory distress   Abdomen: soft, ND, NT, nonperitonitic  MSK/Vascular: full ROM, soft compartments, warm extremities, 2+ peripheral pulses  Neuro: AAOx3, sensation to light touch intact  Psych: calm, cooperative      RADIOLOGY:     < from: Xray Chest 1 View- PORTABLE-Urgent (Xray Chest 1 View- PORTABLE-Urgent .) (12.20.23 @ 20:41) >    ACC: [id number] EXAM:  XR CHEST PORTABLE URGENT 1V   ORDERED BY: ISAURA HOROWITZ     PROCEDURE DATE:  12/20/2023          INTERPRETATION:  Portable chest radiograph    CLINICAL INFORMATION: Weakness    TECHNIQUE:  Portable  AP chest radiograph.    COMPARISON: None. .    FINDINGS:  CATHETERS AND TUBES: None    PULMONARY: The visualized lungs are clear of airspace consolidations or   pleural effusions.   No pneumothorax.    HEART/VASCULAR: The heart and mediastinum size and configuration are   within normal limits.    BONES: Visualized osseous thorax intact.    IMPRESSION:   No radiographic evidence of active chest disease.    --- End of Report ---            CONCHA MENDIETA MD; Attending Radiologist  This document has been electronically signed. Dec 21 2023  7:58AM

## 2023-12-21 NOTE — CHART NOTE - NSCHARTNOTEFT_GEN_A_CORE
Patient seen and examined this morning. no pain and no further seizures. Tells me she has 2nd degree heart block since a baby. No pacemaker. She reports she stopped drinking the day of admission when she had the seizure.   Triglycerides rechecked this AM and remain >4,000.   Noted to have ST elevations on tele, 12 lead ekg done, only nonspecific st changes no st elevation. will check trops and Echo, ordered.   Discussed with ICU Dr Rodríguez regarding triglycerides and need for insulin drip.   Endocrine consulted who also rec Insulin drip  ICU have accepted patient.   RN notified.     45 mins spent in critical care time

## 2023-12-21 NOTE — SBIRT NOTE ADULT - NSSBIRTALCPASSREFTXDET_GEN_A_CORE
Referral to Treatment Performed. Screening results were   reviewed with the patient and patient was provided information about healthy guidelines and potential negative   consequences associated with level of risk. Motivation and readiness to reduce or stop use was discussed and   goals and activities to make changes were suggested/offered

## 2023-12-22 LAB
ALBUMIN SERPL ELPH-MCNC: 2.8 G/DL — LOW (ref 3.3–5.2)
ALBUMIN SERPL ELPH-MCNC: 2.8 G/DL — LOW (ref 3.3–5.2)
ALP SERPL-CCNC: 220 U/L — HIGH (ref 40–120)
ALP SERPL-CCNC: 220 U/L — HIGH (ref 40–120)
ANION GAP SERPL CALC-SCNC: 11 MMOL/L — SIGNIFICANT CHANGE UP (ref 5–17)
ANION GAP SERPL CALC-SCNC: 11 MMOL/L — SIGNIFICANT CHANGE UP (ref 5–17)
ANION GAP SERPL CALC-SCNC: 12 MMOL/L — SIGNIFICANT CHANGE UP (ref 5–17)
ANION GAP SERPL CALC-SCNC: 12 MMOL/L — SIGNIFICANT CHANGE UP (ref 5–17)
ANION GAP SERPL CALC-SCNC: 13 MMOL/L — SIGNIFICANT CHANGE UP (ref 5–17)
AST SERPL-CCNC: 98 U/L — HIGH
AST SERPL-CCNC: 98 U/L — HIGH
BILIRUB SERPL-MCNC: 1.2 MG/DL — SIGNIFICANT CHANGE UP (ref 0.4–2)
BILIRUB SERPL-MCNC: 1.2 MG/DL — SIGNIFICANT CHANGE UP (ref 0.4–2)
BUN SERPL-MCNC: 13.9 MG/DL — SIGNIFICANT CHANGE UP (ref 8–20)
BUN SERPL-MCNC: 13.9 MG/DL — SIGNIFICANT CHANGE UP (ref 8–20)
BUN SERPL-MCNC: 6.7 MG/DL — LOW (ref 8–20)
BUN SERPL-MCNC: 6.7 MG/DL — LOW (ref 8–20)
BUN SERPL-MCNC: 7.2 MG/DL — LOW (ref 8–20)
BUN SERPL-MCNC: 7.2 MG/DL — LOW (ref 8–20)
BUN SERPL-MCNC: 8.1 MG/DL — SIGNIFICANT CHANGE UP (ref 8–20)
BUN SERPL-MCNC: 8.1 MG/DL — SIGNIFICANT CHANGE UP (ref 8–20)
CALCIUM SERPL-MCNC: 8.1 MG/DL — LOW (ref 8.4–10.5)
CALCIUM SERPL-MCNC: 8.3 MG/DL — LOW (ref 8.4–10.5)
CHLORIDE SERPL-SCNC: 91 MMOL/L — LOW (ref 96–108)
CHLORIDE SERPL-SCNC: 91 MMOL/L — LOW (ref 96–108)
CHLORIDE SERPL-SCNC: 92 MMOL/L — LOW (ref 96–108)
CHLORIDE SERPL-SCNC: 92 MMOL/L — LOW (ref 96–108)
CHLORIDE SERPL-SCNC: 93 MMOL/L — LOW (ref 96–108)
CHLORIDE SERPL-SCNC: 93 MMOL/L — LOW (ref 96–108)
CHLORIDE SERPL-SCNC: 96 MMOL/L — SIGNIFICANT CHANGE UP (ref 96–108)
CHLORIDE SERPL-SCNC: 96 MMOL/L — SIGNIFICANT CHANGE UP (ref 96–108)
CO2 SERPL-SCNC: 22 MMOL/L — SIGNIFICANT CHANGE UP (ref 22–29)
CO2 SERPL-SCNC: 23 MMOL/L — SIGNIFICANT CHANGE UP (ref 22–29)
CREAT SERPL-MCNC: 0.6 MG/DL — SIGNIFICANT CHANGE UP (ref 0.5–1.3)
CREAT SERPL-MCNC: 0.79 MG/DL — SIGNIFICANT CHANGE UP (ref 0.5–1.3)
CREAT SERPL-MCNC: 0.79 MG/DL — SIGNIFICANT CHANGE UP (ref 0.5–1.3)
CREAT SERPL-MCNC: 0.94 MG/DL — SIGNIFICANT CHANGE UP (ref 0.5–1.3)
CREAT SERPL-MCNC: 0.94 MG/DL — SIGNIFICANT CHANGE UP (ref 0.5–1.3)
EGFR: 105 ML/MIN/1.73M2 — SIGNIFICANT CHANGE UP
EGFR: 105 ML/MIN/1.73M2 — SIGNIFICANT CHANGE UP
EGFR: 126 ML/MIN/1.73M2 — SIGNIFICANT CHANGE UP
EGFR: 85 ML/MIN/1.73M2 — SIGNIFICANT CHANGE UP
EGFR: 85 ML/MIN/1.73M2 — SIGNIFICANT CHANGE UP
GAS PNL BLDA: SIGNIFICANT CHANGE UP
GAS PNL BLDA: SIGNIFICANT CHANGE UP
GLUCOSE BLDC GLUCOMTR-MCNC: 134 MG/DL — HIGH (ref 70–99)
GLUCOSE BLDC GLUCOMTR-MCNC: 134 MG/DL — HIGH (ref 70–99)
GLUCOSE BLDC GLUCOMTR-MCNC: 153 MG/DL — HIGH (ref 70–99)
GLUCOSE BLDC GLUCOMTR-MCNC: 153 MG/DL — HIGH (ref 70–99)
GLUCOSE BLDC GLUCOMTR-MCNC: 156 MG/DL — HIGH (ref 70–99)
GLUCOSE BLDC GLUCOMTR-MCNC: 156 MG/DL — HIGH (ref 70–99)
GLUCOSE BLDC GLUCOMTR-MCNC: 205 MG/DL — HIGH (ref 70–99)
GLUCOSE BLDC GLUCOMTR-MCNC: 205 MG/DL — HIGH (ref 70–99)
GLUCOSE BLDC GLUCOMTR-MCNC: 243 MG/DL — HIGH (ref 70–99)
GLUCOSE BLDC GLUCOMTR-MCNC: 243 MG/DL — HIGH (ref 70–99)
GLUCOSE BLDC GLUCOMTR-MCNC: 260 MG/DL — HIGH (ref 70–99)
GLUCOSE BLDC GLUCOMTR-MCNC: 260 MG/DL — HIGH (ref 70–99)
GLUCOSE BLDC GLUCOMTR-MCNC: 298 MG/DL — HIGH (ref 70–99)
GLUCOSE BLDC GLUCOMTR-MCNC: 298 MG/DL — HIGH (ref 70–99)
GLUCOSE BLDC GLUCOMTR-MCNC: 316 MG/DL — HIGH (ref 70–99)
GLUCOSE BLDC GLUCOMTR-MCNC: 316 MG/DL — HIGH (ref 70–99)
GLUCOSE BLDC GLUCOMTR-MCNC: 362 MG/DL — HIGH (ref 70–99)
GLUCOSE BLDC GLUCOMTR-MCNC: 362 MG/DL — HIGH (ref 70–99)
GLUCOSE BLDC GLUCOMTR-MCNC: 388 MG/DL — HIGH (ref 70–99)
GLUCOSE BLDC GLUCOMTR-MCNC: 388 MG/DL — HIGH (ref 70–99)
GLUCOSE BLDC GLUCOMTR-MCNC: 439 MG/DL — HIGH (ref 70–99)
GLUCOSE BLDC GLUCOMTR-MCNC: 439 MG/DL — HIGH (ref 70–99)
GLUCOSE BLDC GLUCOMTR-MCNC: 491 MG/DL — CRITICAL HIGH (ref 70–99)
GLUCOSE BLDC GLUCOMTR-MCNC: 491 MG/DL — CRITICAL HIGH (ref 70–99)
GLUCOSE BLDC GLUCOMTR-MCNC: 51 MG/DL — CRITICAL LOW (ref 70–99)
GLUCOSE BLDC GLUCOMTR-MCNC: 51 MG/DL — CRITICAL LOW (ref 70–99)
GLUCOSE BLDC GLUCOMTR-MCNC: 52 MG/DL — CRITICAL LOW (ref 70–99)
GLUCOSE BLDC GLUCOMTR-MCNC: 52 MG/DL — CRITICAL LOW (ref 70–99)
GLUCOSE BLDC GLUCOMTR-MCNC: 54 MG/DL — CRITICAL LOW (ref 70–99)
GLUCOSE BLDC GLUCOMTR-MCNC: 54 MG/DL — CRITICAL LOW (ref 70–99)
GLUCOSE BLDC GLUCOMTR-MCNC: 71 MG/DL — SIGNIFICANT CHANGE UP (ref 70–99)
GLUCOSE BLDC GLUCOMTR-MCNC: 71 MG/DL — SIGNIFICANT CHANGE UP (ref 70–99)
GLUCOSE BLDC GLUCOMTR-MCNC: 74 MG/DL — SIGNIFICANT CHANGE UP (ref 70–99)
GLUCOSE BLDC GLUCOMTR-MCNC: 74 MG/DL — SIGNIFICANT CHANGE UP (ref 70–99)
GLUCOSE SERPL-MCNC: 126 MG/DL — HIGH (ref 70–99)
GLUCOSE SERPL-MCNC: 126 MG/DL — HIGH (ref 70–99)
GLUCOSE SERPL-MCNC: 230 MG/DL — HIGH (ref 70–99)
GLUCOSE SERPL-MCNC: 230 MG/DL — HIGH (ref 70–99)
GLUCOSE SERPL-MCNC: 393 MG/DL — HIGH (ref 70–99)
GLUCOSE SERPL-MCNC: 393 MG/DL — HIGH (ref 70–99)
GLUCOSE SERPL-MCNC: 415 MG/DL — HIGH (ref 70–99)
GLUCOSE SERPL-MCNC: 415 MG/DL — HIGH (ref 70–99)
HCT VFR BLD CALC: 33.9 % — LOW (ref 34.5–45)
HCT VFR BLD CALC: 33.9 % — LOW (ref 34.5–45)
HGB BLD-MCNC: 12.1 G/DL — SIGNIFICANT CHANGE UP (ref 11.5–15.5)
HGB BLD-MCNC: 12.1 G/DL — SIGNIFICANT CHANGE UP (ref 11.5–15.5)
MAGNESIUM SERPL-MCNC: 1.8 MG/DL — SIGNIFICANT CHANGE UP (ref 1.6–2.6)
MAGNESIUM SERPL-MCNC: 1.8 MG/DL — SIGNIFICANT CHANGE UP (ref 1.6–2.6)
MAGNESIUM SERPL-MCNC: 1.8 MG/DL — SIGNIFICANT CHANGE UP (ref 1.8–2.6)
MAGNESIUM SERPL-MCNC: 1.8 MG/DL — SIGNIFICANT CHANGE UP (ref 1.8–2.6)
MAGNESIUM SERPL-MCNC: 1.9 MG/DL — SIGNIFICANT CHANGE UP (ref 1.8–2.6)
MAGNESIUM SERPL-MCNC: 1.9 MG/DL — SIGNIFICANT CHANGE UP (ref 1.8–2.6)
MCHC RBC-ENTMCNC: 33.1 PG — SIGNIFICANT CHANGE UP (ref 27–34)
MCHC RBC-ENTMCNC: 33.1 PG — SIGNIFICANT CHANGE UP (ref 27–34)
MCHC RBC-ENTMCNC: 35.4 GM/DL — SIGNIFICANT CHANGE UP (ref 32–36)
MCHC RBC-ENTMCNC: 35.4 GM/DL — SIGNIFICANT CHANGE UP (ref 32–36)
MCV RBC AUTO: 94.4 FL — SIGNIFICANT CHANGE UP (ref 80–100)
MCV RBC AUTO: 94.4 FL — SIGNIFICANT CHANGE UP (ref 80–100)
OSMOLALITY SERPL: 304 MOSMOL/KG — HIGH (ref 275–300)
OSMOLALITY SERPL: 304 MOSMOL/KG — HIGH (ref 275–300)
OSMOLALITY UR: 736 MOSM/KG — SIGNIFICANT CHANGE UP (ref 300–1000)
OSMOLALITY UR: 736 MOSM/KG — SIGNIFICANT CHANGE UP (ref 300–1000)
PHOSPHATE SERPL-MCNC: 1.6 MG/DL — LOW (ref 2.4–4.7)
PHOSPHATE SERPL-MCNC: 2.4 MG/DL — SIGNIFICANT CHANGE UP (ref 2.4–4.7)
PHOSPHATE SERPL-MCNC: 2.4 MG/DL — SIGNIFICANT CHANGE UP (ref 2.4–4.7)
PLATELET # BLD AUTO: 131 K/UL — LOW (ref 150–400)
PLATELET # BLD AUTO: 131 K/UL — LOW (ref 150–400)
POTASSIUM SERPL-MCNC: 4.5 MMOL/L — SIGNIFICANT CHANGE UP (ref 3.5–5.3)
POTASSIUM SERPL-MCNC: 4.5 MMOL/L — SIGNIFICANT CHANGE UP (ref 3.5–5.3)
POTASSIUM SERPL-MCNC: 4.6 MMOL/L — SIGNIFICANT CHANGE UP (ref 3.5–5.3)
POTASSIUM SERPL-MCNC: 4.6 MMOL/L — SIGNIFICANT CHANGE UP (ref 3.5–5.3)
POTASSIUM SERPL-MCNC: 5.5 MMOL/L — HIGH (ref 3.5–5.3)
POTASSIUM SERPL-MCNC: 5.5 MMOL/L — HIGH (ref 3.5–5.3)
POTASSIUM SERPL-MCNC: 6.5 MMOL/L — CRITICAL HIGH (ref 3.5–5.3)
POTASSIUM SERPL-MCNC: 6.5 MMOL/L — CRITICAL HIGH (ref 3.5–5.3)
POTASSIUM SERPL-SCNC: 4.5 MMOL/L — SIGNIFICANT CHANGE UP (ref 3.5–5.3)
POTASSIUM SERPL-SCNC: 4.5 MMOL/L — SIGNIFICANT CHANGE UP (ref 3.5–5.3)
POTASSIUM SERPL-SCNC: 4.6 MMOL/L — SIGNIFICANT CHANGE UP (ref 3.5–5.3)
POTASSIUM SERPL-SCNC: 4.6 MMOL/L — SIGNIFICANT CHANGE UP (ref 3.5–5.3)
POTASSIUM SERPL-SCNC: 5.5 MMOL/L — HIGH (ref 3.5–5.3)
POTASSIUM SERPL-SCNC: 5.5 MMOL/L — HIGH (ref 3.5–5.3)
POTASSIUM SERPL-SCNC: 6.5 MMOL/L — CRITICAL HIGH (ref 3.5–5.3)
POTASSIUM SERPL-SCNC: 6.5 MMOL/L — CRITICAL HIGH (ref 3.5–5.3)
PROT SERPL-MCNC: 5.7 G/DL — LOW (ref 6.6–8.7)
PROT SERPL-MCNC: 5.7 G/DL — LOW (ref 6.6–8.7)
RBC # BLD: 3.59 M/UL — LOW (ref 3.8–5.2)
RBC # BLD: 3.59 M/UL — LOW (ref 3.8–5.2)
RBC # FLD: 13.1 % — SIGNIFICANT CHANGE UP (ref 10.3–14.5)
RBC # FLD: 13.1 % — SIGNIFICANT CHANGE UP (ref 10.3–14.5)
SODIUM SERPL-SCNC: 125 MMOL/L — LOW (ref 135–145)
SODIUM SERPL-SCNC: 129 MMOL/L — LOW (ref 135–145)
SODIUM SERPL-SCNC: 129 MMOL/L — LOW (ref 135–145)
SODIUM SERPL-SCNC: 132 MMOL/L — LOW (ref 135–145)
SODIUM SERPL-SCNC: 132 MMOL/L — LOW (ref 135–145)
SODIUM UR-SCNC: 102 MMOL/L — SIGNIFICANT CHANGE UP
SODIUM UR-SCNC: 102 MMOL/L — SIGNIFICANT CHANGE UP
TRIGL SERPL-MCNC: 1194 MG/DL — HIGH
TRIGL SERPL-MCNC: 1194 MG/DL — HIGH
TRIGL SERPL-MCNC: 1549 MG/DL — HIGH
TRIGL SERPL-MCNC: 1549 MG/DL — HIGH
TRIGL SERPL-MCNC: 2478 MG/DL — HIGH
TRIGL SERPL-MCNC: 2478 MG/DL — HIGH
WBC # BLD: 5.49 K/UL — SIGNIFICANT CHANGE UP (ref 3.8–10.5)
WBC # BLD: 5.49 K/UL — SIGNIFICANT CHANGE UP (ref 3.8–10.5)
WBC # FLD AUTO: 5.49 K/UL — SIGNIFICANT CHANGE UP (ref 3.8–10.5)
WBC # FLD AUTO: 5.49 K/UL — SIGNIFICANT CHANGE UP (ref 3.8–10.5)

## 2023-12-22 PROCEDURE — 99233 SBSQ HOSP IP/OBS HIGH 50: CPT

## 2023-12-22 RX ORDER — ATORVASTATIN CALCIUM 80 MG/1
20 TABLET, FILM COATED ORAL AT BEDTIME
Refills: 0 | Status: DISCONTINUED | OUTPATIENT
Start: 2023-12-22 | End: 2023-12-24

## 2023-12-22 RX ORDER — INSULIN HUMAN 100 [IU]/ML
10 INJECTION, SOLUTION SUBCUTANEOUS
Qty: 50 | Refills: 0 | Status: DISCONTINUED | OUTPATIENT
Start: 2023-12-22 | End: 2023-12-22

## 2023-12-22 RX ORDER — POTASSIUM CHLORIDE 20 MEQ
10 PACKET (EA) ORAL
Refills: 0 | Status: COMPLETED | OUTPATIENT
Start: 2023-12-22 | End: 2023-12-22

## 2023-12-22 RX ORDER — INSULIN HUMAN 100 [IU]/ML
5 INJECTION, SOLUTION SUBCUTANEOUS ONCE
Refills: 0 | Status: COMPLETED | OUTPATIENT
Start: 2023-12-22 | End: 2023-12-22

## 2023-12-22 RX ORDER — INSULIN LISPRO 100/ML
VIAL (ML) SUBCUTANEOUS AT BEDTIME
Refills: 0 | Status: DISCONTINUED | OUTPATIENT
Start: 2023-12-22 | End: 2023-12-24

## 2023-12-22 RX ORDER — DEXTROSE 50 % IN WATER 50 %
50 SYRINGE (ML) INTRAVENOUS ONCE
Refills: 0 | Status: COMPLETED | OUTPATIENT
Start: 2023-12-22 | End: 2023-12-22

## 2023-12-22 RX ORDER — INSULIN GLARGINE 100 [IU]/ML
16 INJECTION, SOLUTION SUBCUTANEOUS EVERY MORNING
Refills: 0 | Status: DISCONTINUED | OUTPATIENT
Start: 2023-12-22 | End: 2023-12-24

## 2023-12-22 RX ORDER — POTASSIUM CHLORIDE 20 MEQ
40 PACKET (EA) ORAL EVERY 4 HOURS
Refills: 0 | Status: COMPLETED | OUTPATIENT
Start: 2023-12-22 | End: 2023-12-22

## 2023-12-22 RX ORDER — INSULIN LISPRO 100/ML
VIAL (ML) SUBCUTANEOUS
Refills: 0 | Status: DISCONTINUED | OUTPATIENT
Start: 2023-12-22 | End: 2023-12-24

## 2023-12-22 RX ADMIN — GABAPENTIN 100 MILLIGRAM(S): 400 CAPSULE ORAL at 15:11

## 2023-12-22 RX ADMIN — Medication 650 MILLIGRAM(S): at 01:45

## 2023-12-22 RX ADMIN — Medication 40 MILLIEQUIVALENT(S): at 01:19

## 2023-12-22 RX ADMIN — GABAPENTIN 100 MILLIGRAM(S): 400 CAPSULE ORAL at 22:20

## 2023-12-22 RX ADMIN — Medication 1.5 MILLIGRAM(S): at 22:20

## 2023-12-22 RX ADMIN — Medication 50 MILLILITER(S): at 00:15

## 2023-12-22 RX ADMIN — Medication 1.5 MILLIGRAM(S): at 18:34

## 2023-12-22 RX ADMIN — Medication 2 GRAM(S): at 05:10

## 2023-12-22 RX ADMIN — PANTOPRAZOLE SODIUM 40 MILLIGRAM(S): 20 TABLET, DELAYED RELEASE ORAL at 18:35

## 2023-12-22 RX ADMIN — Medication 100 MILLIEQUIVALENT(S): at 10:02

## 2023-12-22 RX ADMIN — Medication 100 MILLIEQUIVALENT(S): at 09:02

## 2023-12-22 RX ADMIN — Medication 1.5 MILLIGRAM(S): at 05:11

## 2023-12-22 RX ADMIN — Medication 650 MILLIGRAM(S): at 01:18

## 2023-12-22 RX ADMIN — Medication 1.5 MILLIGRAM(S): at 01:18

## 2023-12-22 RX ADMIN — Medication 145 MILLIGRAM(S): at 11:58

## 2023-12-22 RX ADMIN — Medication 2: at 16:50

## 2023-12-22 RX ADMIN — Medication 100 MILLIEQUIVALENT(S): at 01:52

## 2023-12-22 RX ADMIN — Medication 100 MILLILITER(S): at 01:48

## 2023-12-22 RX ADMIN — INSULIN GLARGINE 16 UNIT(S): 100 INJECTION, SOLUTION SUBCUTANEOUS at 11:56

## 2023-12-22 RX ADMIN — GABAPENTIN 100 MILLIGRAM(S): 400 CAPSULE ORAL at 05:11

## 2023-12-22 RX ADMIN — Medication 100 MILLIGRAM(S): at 11:58

## 2023-12-22 RX ADMIN — Medication 40 MILLIEQUIVALENT(S): at 05:10

## 2023-12-22 RX ADMIN — Medication 6: at 22:18

## 2023-12-22 RX ADMIN — Medication 100 MILLIEQUIVALENT(S): at 04:10

## 2023-12-22 RX ADMIN — Medication 1.5 MILLIGRAM(S): at 11:30

## 2023-12-22 RX ADMIN — CHLORHEXIDINE GLUCONATE 1 APPLICATION(S): 213 SOLUTION TOPICAL at 05:10

## 2023-12-22 RX ADMIN — Medication 1 MILLIGRAM(S): at 11:58

## 2023-12-22 RX ADMIN — Medication 1 TABLET(S): at 12:08

## 2023-12-22 RX ADMIN — Medication 1.5 MILLIGRAM(S): at 15:11

## 2023-12-22 RX ADMIN — PANTOPRAZOLE SODIUM 40 MILLIGRAM(S): 20 TABLET, DELAYED RELEASE ORAL at 05:11

## 2023-12-22 RX ADMIN — Medication 2 GRAM(S): at 18:34

## 2023-12-22 NOTE — PROVIDER CONTACT NOTE (HYPOGLYCEMIA EVENT) - NS PROVIDER CONTACT RECOMMEND-HYPO
pause insulin gtt and run D10@150ml/hr and recheck in 15 min 
recheck glucose in 15 minutes. insulin gtt on hold still. 
repeat FS 57 provider notified 1 am d50 ordered and given plan to recheck in 15 min insulin gtt continues to be held.

## 2023-12-22 NOTE — PROGRESS NOTE ADULT - ASSESSMENT
28F PMH IDDM1, EtOH use, congenital heart block, hypoglycemic seizures who presented 12/21/23 after a witnessed seizure. She reports this usually occurs when she is hypoglycemic (around 70s). She drinks 2 pints of Vodka daily, and has been trying to cut down. She was started on Ativan taper in the ED. Noted on labs to have TG >4000, and started on insulin gtt and admitted to ICU. Lipase was negative, no significant signs of pancreatitis. Given episodes of hypo and hyperglycemia, insulin gtt held and resumed on home Lantus 16U.  28F PMH IDDM1, EtOH use, congenital heart block, hypoglycemic seizures who presented 12/21/23 after a witnessed seizure. She reports this usually occurs when she is hypoglycemic (around 70s). She drinks 2 pints of Vodka daily, and has been trying to cut down. She was started on Ativan taper in the ED. Noted on labs to have TG >4000, and started on insulin gtt and admitted to ICU. Lipase was negative, no significant signs of pancreatitis. Given episodes of hypo and hyperglycemia, insulin gtt held and resumed on home Lantus 16U.     Hypertriglyeridemia  - No evidence of pancreatitis  - Insulin infusion held given episodes of hypo- and hyperglycemia  - C/w Fenofibrate 145mg PO QDaily  - C/w Lovaza 2g PO BID  - Repeat TG level tomorrow  - F/u with endocrinology  - Should see nutritionist as outpatient    EtOH use  Witnessed seizure - likely 2/2 EtOH withdrawal  H/o hypoglycemic seizures  - C/w Ativan taper  - Thiamine/Folic acid/MVI  - Trend CIWA    IDDM1  - Resumed Lantus 16U    Diet - Consistent carb  PPx - PPI  Lines/Tubes - PIV  Code Status - Full  Dispo - Pt stable for downgrade from ICU      Devyn Rodríguez M.D.  , Pulmonary & Critical Care Medicine  [facility]  Pulmonary and Sleep Medicine at [place]  [address]  [place], [place] [address]  T: [phone]  F: [phone] 28F PMH IDDM1, EtOH use, congenital heart block, hypoglycemic seizures who presented 12/21/23 after a witnessed seizure. She reports this usually occurs when she is hypoglycemic (around 70s). She drinks 2 pints of Vodka daily, and has been trying to cut down. She was started on Ativan taper in the ED. Noted on labs to have TG >4000, and started on insulin gtt and admitted to ICU. Lipase was negative, no significant signs of pancreatitis. Given episodes of hypo and hyperglycemia, insulin gtt held and resumed on home Lantus 16U.     Hypertriglyeridemia  - No evidence of pancreatitis  - Insulin infusion held given episodes of hypo- and hyperglycemia  - C/w Fenofibrate 145mg PO QDaily  - C/w Lovaza 2g PO BID  - Repeat TG level tomorrow  - F/u with endocrinology  - Should see nutritionist as outpatient    EtOH use  Witnessed seizure - likely 2/2 EtOH withdrawal  H/o hypoglycemic seizures  - C/w Ativan taper  - Thiamine/Folic acid/MVI  - Trend CIWA    IDDM1  - Resumed Lantus 16U    Diet - Consistent carb  PPx - PPI  Lines/Tubes - PIV  Code Status - Full  Dispo - Pt stable for downgrade from ICU      Devyn Rodríguez M.D.  , Pulmonary & Critical Care Medicine  [facility]  Pulmonary and Sleep Medicine at [place]  [address]  [place], [place] [address]  T: [phone]  F: [phone] 28F PMH IDDM1, EtOH use, congenital heart block, hypoglycemic seizures who presented 12/21/23 after a witnessed seizure. She reports this usually occurs when she is hypoglycemic (around 70s). She drinks 2 pints of Vodka daily, and has been trying to cut down. She was started on Ativan taper in the ED. Noted on labs to have TG >4000, and started on insulin gtt and admitted to ICU. Lipase was negative, no significant signs of pancreatitis. Given episodes of hypo and hyperglycemia, insulin gtt held and resumed on home Lantus 16U.     Hypertriglyeridemia  - No evidence of pancreatitis  - Insulin infusion held given episodes of hypo- and hyperglycemia  - C/w Fenofibrate 145mg PO QDaily  - C/w Lovaza 2g PO BID  - Repeat TG level now  - F/u with endocrinology  - Should see nutritionist as outpatient    EtOH use  Witnessed seizure - likely 2/2 EtOH withdrawal  H/o hypoglycemic seizures  - C/w Ativan taper  - Thiamine/Folic acid/MVI  - Trend CIWA    IDDM1  - Resumed Lantus 16U    Diet - Consistent carb  PPx - PPI  Lines/Tubes - PIV  Code Status - Full  Dispo - If TG level is in 1000s, will no longer continue insulin, and may downgrade to medical floor      Devyn Rodríguez M.D.  , Pulmonary & Critical Care Medicine  [facility]  Pulmonary and Sleep Medicine at [place]  [address]  [place], [place] [address]  T: [phone]  F: [phone] 28F PMH IDDM1, EtOH use, congenital heart block, hypoglycemic seizures who presented 12/21/23 after a witnessed seizure. She reports this usually occurs when she is hypoglycemic (around 70s). She drinks 2 pints of Vodka daily, and has been trying to cut down. She was started on Ativan taper in the ED. Noted on labs to have TG >4000, and started on insulin gtt and admitted to ICU. Lipase was negative, no significant signs of pancreatitis. Given episodes of hypo and hyperglycemia, insulin gtt held and resumed on home Lantus 16U.     Hypertriglyeridemia  - No evidence of pancreatitis  - Insulin infusion held given episodes of hypo- and hyperglycemia  - C/w Fenofibrate 145mg PO QDaily  - C/w Lovaza 2g PO BID  - Repeat TG level now  - F/u with endocrinology  - Should see nutritionist as outpatient    EtOH use  Witnessed seizure - likely 2/2 EtOH withdrawal  H/o hypoglycemic seizures  - C/w Ativan taper  - Thiamine/Folic acid/MVI  - Trend CIWA    IDDM1  - Resumed Lantus 16U    Diet - Consistent carb  PPx - PPI  Lines/Tubes - PIV  Code Status - Full  Dispo - If TG level is in 1000s, will no longer continue insulin, and may downgrade to medical floor      Devyn Rodríguez M.D.  , Pulmonary & Critical Care Medicine  [facility]  Pulmonary and Sleep Medicine at [place]  [address]  [place], [place] [address]  T: [phone]  F: [phone]

## 2023-12-22 NOTE — PROVIDER CONTACT NOTE (HYPOGLYCEMIA EVENT) - NS PROVIDER CONTACT BACKGROUND-HYPO
Age: 28y    Gender: Female    POCT Blood Glucose:  64 mg/dL (12-21-23 @ 18:06)  126 mg/dL (12-21-23 @ 17:05)  231 mg/dL (12-21-23 @ 16:14)  410 mg/dL (12-21-23 @ 14:43)  327 mg/dL (12-21-23 @ 12:48)  172 mg/dL (12-21-23 @ 08:54)  198 mg/dL (12-21-23 @ 05:10)  72 mg/dL (12-21-23 @ 01:49)      eMAR:  fenofibrate Tablet   145 milliGRAM(s) Oral (12-21-23 @ 14:37)    insulin regular Infusion   20 mL/Hr IV Continuous (12-21-23 @ 12:44)    
Age: 28y    Gender: Female    POCT Blood Glucose:  51 mg/dL (12-22-23 @ 01:27)  52 mg/dL (12-22-23 @ 01:25)  156 mg/dL (12-22-23 @ 00:34)  54 mg/dL (12-22-23 @ 00:16)  284 mg/dL (12-21-23 @ 23:14)  380 mg/dL (12-21-23 @ 22:21)  339 mg/dL (12-21-23 @ 21:25)  252 mg/dL (12-21-23 @ 20:14)      eMAR:  dextrose 50% Injectable   50 milliLiter(s) IV Push (12-21-23 @ 18:25)    dextrose 50% Injectable   50 milliLiter(s) IV Push (12-22-23 @ 00:15)    fenofibrate Tablet   145 milliGRAM(s) Oral (12-21-23 @ 14:37)    insulin regular Infusion   15 mL/Hr IV Continuous (12-21-23 @ 19:16)   20 mL/Hr IV Continuous (12-21-23 @ 12:44)    
Age: 28y    Gender: Female    POCT Blood Glucose:  54 mg/dL (12-22-23 @ 00:16)  284 mg/dL (12-21-23 @ 23:14)  380 mg/dL (12-21-23 @ 22:21)  339 mg/dL (12-21-23 @ 21:25)  252 mg/dL (12-21-23 @ 20:14)  198 mg/dL (12-21-23 @ 18:39)  223 mg/dL (12-21-23 @ 18:38)  57 mg/dL (12-21-23 @ 18:20)      eMAR:dextrose 50% Injectable   50 milliLiter(s) IV Push (12-21-23 @ 18:25)    fenofibrate Tablet   145 milliGRAM(s) Oral (12-21-23 @ 14:37)    insulin regular Infusion   15 mL/Hr IV Continuous (12-21-23 @ 19:16)   20 mL/Hr IV Continuous (12-21-23 @ 12:44)    
Age: 28y    Gender: Female    POCT Blood Glucose:  57 mg/dL (12-21-23 @ 18:20)  64 mg/dL (12-21-23 @ 18:06)  126 mg/dL (12-21-23 @ 17:05)  231 mg/dL (12-21-23 @ 16:14)  410 mg/dL (12-21-23 @ 14:43)  327 mg/dL (12-21-23 @ 12:48)  172 mg/dL (12-21-23 @ 08:54)  198 mg/dL (12-21-23 @ 05:10)      eMAR:  fenofibrate Tablet   145 milliGRAM(s) Oral (12-21-23 @ 14:37)    insulin regular Infusion   20 mL/Hr IV Continuous (12-21-23 @ 12:44)

## 2023-12-22 NOTE — PROVIDER CONTACT NOTE (HYPOGLYCEMIA EVENT) - NS PROVIDER CONTACT CONTRIBUTING FACTORS OF EPISODE
insulin gtt/Patient NPO greater than 8 hours/Other (Specify)
Liver failure
insulin drip/Patient NPO greater than 8 hours/Other (Specify)
Liver failure

## 2023-12-22 NOTE — PROGRESS NOTE ADULT - ASSESSMENT
28F with PMH of ETOH abuse, IDDM, congenital heart block was brought to the ED for seizure. Endocrine consulted for diabetes management, she was found to have hypoglycemia by EMS. Triglycerides also noted >4000, no history of this in past.    Consulted for diabetes, elevated triglycerides  Home regimen: Basaglar 15 units daily, Fiasp 1 for 15 gm cho with meals  Current a1c: 8.4%  Outpatient Endocrinologist: Sees an Endo in [place]    1. Hypertriglyceridemia  - Triglycerides 2,478  - Recommend to continue insulin gtt until triglycerides are closer to 1,000  - Use hypoglycemia protocol with D5 and insulin gtt  - On fenofibrate, fish oil  - Diet modification with alcohol abstinence is very important.    2. DM type 1  - Continue insulin gtt now for high triglycerides, then can transition to basal bolus regimen  - Monitor FS    3. ETOH abuse/seizure  - Ativan taper  - Alcohol cessation 28F with PMH of ETOH abuse, IDDM, congenital heart block was brought to the ED for seizure. Endocrine consulted for diabetes management, she was found to have hypoglycemia by EMS. Triglycerides also noted >4000, no history of this in past.    Consulted for diabetes, elevated triglycerides  Home regimen: Basaglar 15 units daily, Fiasp 1 for 15 gm cho with meals  Current a1c: 8.4%  Outpatient Endocrinologist: Sees an Endo in [place]    1. Hypertriglyceridemia  - Recommend to continue insulin gtt until triglycerides below 2000s if possible,  however also need to avoid lows.  - Triglycerides 2,478, repeat TG in a few hrs today again, if getting better, ok to stop insulin gtt.  -Then start home doses of insulin, may start lantus 16 units daily and admelog 5  units tid with meals and ssi.  - On fenofibrate, fish oil  - Diet modification with alcohol abstinence is very important.    2. DM type 1  - Continue insulin gtt now for high triglycerides, then can transition to basal bolus regimen  - Monitor FS    3. ETOH abuse/seizure  - Ativan taper  - Alcohol cessation

## 2023-12-22 NOTE — PROGRESS NOTE ADULT - SUBJECTIVE AND OBJECTIVE BOX
INTERVAL EVENTS:  Follow up hypertriglyceridemia, diabetes management    ROS: Complains of fatigue, denies abd pain/nausea/vomiting    MEDICATIONS  (STANDING):  chlorhexidine 2% Cloths 1 Application(s) Topical <User Schedule>  dextrose 10%. 1000 milliLiter(s) (100 mL/Hr) IV Continuous <Continuous>  dextrose 50% Injectable 25 Gram(s) IV Push once  dextrose 50% Injectable 25 Gram(s) IV Push once  dextrose 50% Injectable 12.5 Gram(s) IV Push once  fenofibrate Tablet 145 milliGRAM(s) Oral daily  folic acid 1 milliGRAM(s) Oral daily  gabapentin 100 milliGRAM(s) Oral three times a day  glucagon  Injectable 1 milliGRAM(s) IntraMuscular once  influenza   Vaccine 0.5 milliLiter(s) IntraMuscular once  insulin glargine Injectable (LANTUS) 16 Unit(s) SubCutaneous every morning  LORazepam     Tablet 1.5 milliGRAM(s) Oral every 4 hours  LORazepam     Tablet   Oral   multivitamin 1 Tablet(s) Oral daily  omega-3-Acid Ethyl Esters 2 Gram(s) Oral two times a day  pantoprazole  Injectable 40 milliGRAM(s) IV Push two times a day  thiamine 100 milliGRAM(s) Oral daily    MEDICATIONS  (PRN):  acetaminophen     Tablet .. 650 milliGRAM(s) Oral every 6 hours PRN Temp greater or equal to 38C (100.4F), Mild Pain (1 - 3)  aluminum hydroxide/magnesium hydroxide/simethicone Suspension 30 milliLiter(s) Oral every 4 hours PRN Dyspepsia  dextrose Oral Gel 15 Gram(s) Oral once PRN Blood Glucose LESS THAN 70 milliGRAM(s)/deciliter  LORazepam   Injectable 2 milliGRAM(s) IV Push every 1 hour PRN Symptom-triggered: each CIWA -Ar score 8 or GREATER  melatonin 3 milliGRAM(s) Oral at bedtime PRN Insomnia  ondansetron Injectable 4 milliGRAM(s) IV Push every 8 hours PRN Nausea and/or Vomiting    Allergies  No Known Allergies    Vital Signs Last 24 Hrs  T(C): 36.8 (22 Dec 2023 07:00), Max: 37.2 (21 Dec 2023 15:46)  T(F): 98.3 (22 Dec 2023 07:00), Max: 99 (21 Dec 2023 15:46)  HR: 101 (22 Dec 2023 10:00) (55 - 125)  BP: 94/65 (22 Dec 2023 10:00) (94/65 - 126/94)  BP(mean): 74 (22 Dec 2023 10:00) (74 - 105)  RR: 16 (22 Dec 2023 10:00) (10 - 25)  SpO2: 100% (22 Dec 2023 10:00) (96% - 100%)    Parameters below as of 22 Dec 2023 08:00  Patient On (Oxygen Delivery Method): room air    PHYSICAL EXAM:  General: No apparent distress  Neck: Supple, trachea midline, no thyromegaly  Respiratory: Lungs clear bilaterally  Cardiac: +S1, S2, no m/r/g  GI: +BS, soft, non tender, non distended  Extremities: No edema  Neuro: A+O X3, no tremor    LABS:                        12.1   5.49  )-----------( 131      ( 22 Dec 2023 03:58 )             33.9     12-22    132<L>  |  96  |  7.2<L>  ----------------------------<  126<H>  4.5   |  23.0  |  0.60    Ca    8.3<L>      22 Dec 2023 03:58  Phos  1.6     12-22  Mg     1.8     12-22    TPro  4.9<L>  /  Alb  2.2<L>  /  TBili  2.0  /  DBili  x   /  AST  171<H>  /  ALT  50<H>  /  AlkPhos  123<H>  12-21    Urinalysis Basic - ( 22 Dec 2023 03:58 )    Color: x / Appearance: x / SG: x / pH: x  Gluc: 126 mg/dL / Ketone: x  / Bili: x / Urobili: x   Blood: x / Protein: x / Nitrite: x   Leuk Esterase: x / RBC: x / WBC x   Sq Epi: x / Non Sq Epi: x / Bacteria: x    POCT Blood Glucose.: 243 mg/dL (12-22-23 @ 06:21)  POCT Blood Glucose.: 74 mg/dL (12-22-23 @ 05:18)  POCT Blood Glucose.: 71 mg/dL (12-22-23 @ 04:20)  POCT Blood Glucose.: 134 mg/dL (12-22-23 @ 03:55)  POCT Blood Glucose.: 260 mg/dL (12-22-23 @ 02:50)  POCT Blood Glucose.: 205 mg/dL (12-22-23 @ 01:50)  POCT Blood Glucose.: 51 mg/dL (12-22-23 @ 01:27)

## 2023-12-22 NOTE — PROVIDER CONTACT NOTE (HYPOGLYCEMIA EVENT) - NS PROVIDER CONTACT NOTE-TREATMENT-HYPO
Dextrose 50% 25 Grams IV Push
Other (Specify)

## 2023-12-23 DIAGNOSIS — Z86.79 PERSONAL HISTORY OF OTHER DISEASES OF THE CIRCULATORY SYSTEM: ICD-10-CM

## 2023-12-23 LAB
ALBUMIN SERPL ELPH-MCNC: 2.9 G/DL — LOW (ref 3.3–5.2)
ALBUMIN SERPL ELPH-MCNC: 2.9 G/DL — LOW (ref 3.3–5.2)
ALP SERPL-CCNC: 204 U/L — HIGH (ref 40–120)
ALP SERPL-CCNC: 204 U/L — HIGH (ref 40–120)
ALT FLD-CCNC: 38 U/L — HIGH
ALT FLD-CCNC: 38 U/L — HIGH
ALT FLD-CCNC: 42 U/L — HIGH
ALT FLD-CCNC: 42 U/L — HIGH
AMPHET UR-MCNC: NEGATIVE — SIGNIFICANT CHANGE UP
AMPHET UR-MCNC: NEGATIVE — SIGNIFICANT CHANGE UP
ANION GAP SERPL CALC-SCNC: 11 MMOL/L — SIGNIFICANT CHANGE UP (ref 5–17)
ANION GAP SERPL CALC-SCNC: 11 MMOL/L — SIGNIFICANT CHANGE UP (ref 5–17)
ANION GAP SERPL CALC-SCNC: 8 MMOL/L — SIGNIFICANT CHANGE UP (ref 5–17)
ANION GAP SERPL CALC-SCNC: 8 MMOL/L — SIGNIFICANT CHANGE UP (ref 5–17)
AST SERPL-CCNC: 70 U/L — HIGH
AST SERPL-CCNC: 70 U/L — HIGH
BARBITURATES UR SCN-MCNC: NEGATIVE — SIGNIFICANT CHANGE UP
BARBITURATES UR SCN-MCNC: NEGATIVE — SIGNIFICANT CHANGE UP
BENZODIAZ UR-MCNC: NEGATIVE — SIGNIFICANT CHANGE UP
BENZODIAZ UR-MCNC: NEGATIVE — SIGNIFICANT CHANGE UP
BILIRUB SERPL-MCNC: 1.1 MG/DL — SIGNIFICANT CHANGE UP (ref 0.4–2)
BILIRUB SERPL-MCNC: 1.1 MG/DL — SIGNIFICANT CHANGE UP (ref 0.4–2)
BUN SERPL-MCNC: 15.6 MG/DL — SIGNIFICANT CHANGE UP (ref 8–20)
BUN SERPL-MCNC: 15.6 MG/DL — SIGNIFICANT CHANGE UP (ref 8–20)
BUN SERPL-MCNC: 17.5 MG/DL — SIGNIFICANT CHANGE UP (ref 8–20)
BUN SERPL-MCNC: 17.5 MG/DL — SIGNIFICANT CHANGE UP (ref 8–20)
CALCIUM SERPL-MCNC: 8 MG/DL — LOW (ref 8.4–10.5)
CALCIUM SERPL-MCNC: 8 MG/DL — LOW (ref 8.4–10.5)
CALCIUM SERPL-MCNC: 8.6 MG/DL — SIGNIFICANT CHANGE UP (ref 8.4–10.5)
CALCIUM SERPL-MCNC: 8.6 MG/DL — SIGNIFICANT CHANGE UP (ref 8.4–10.5)
CHLORIDE SERPL-SCNC: 102 MMOL/L — SIGNIFICANT CHANGE UP (ref 96–108)
CHLORIDE SERPL-SCNC: 102 MMOL/L — SIGNIFICANT CHANGE UP (ref 96–108)
CHLORIDE SERPL-SCNC: 91 MMOL/L — LOW (ref 96–108)
CHLORIDE SERPL-SCNC: 91 MMOL/L — LOW (ref 96–108)
CO2 SERPL-SCNC: 26 MMOL/L — SIGNIFICANT CHANGE UP (ref 22–29)
COCAINE METAB.OTHER UR-MCNC: NEGATIVE — SIGNIFICANT CHANGE UP
COCAINE METAB.OTHER UR-MCNC: NEGATIVE — SIGNIFICANT CHANGE UP
CREAT SERPL-MCNC: 0.84 MG/DL — SIGNIFICANT CHANGE UP (ref 0.5–1.3)
CREAT SERPL-MCNC: 0.84 MG/DL — SIGNIFICANT CHANGE UP (ref 0.5–1.3)
CREAT SERPL-MCNC: 1.03 MG/DL — SIGNIFICANT CHANGE UP (ref 0.5–1.3)
CREAT SERPL-MCNC: 1.03 MG/DL — SIGNIFICANT CHANGE UP (ref 0.5–1.3)
EGFR: 76 ML/MIN/1.73M2 — SIGNIFICANT CHANGE UP
EGFR: 76 ML/MIN/1.73M2 — SIGNIFICANT CHANGE UP
EGFR: 98 ML/MIN/1.73M2 — SIGNIFICANT CHANGE UP
EGFR: 98 ML/MIN/1.73M2 — SIGNIFICANT CHANGE UP
GLUCOSE BLDC GLUCOMTR-MCNC: 178 MG/DL — HIGH (ref 70–99)
GLUCOSE BLDC GLUCOMTR-MCNC: 178 MG/DL — HIGH (ref 70–99)
GLUCOSE BLDC GLUCOMTR-MCNC: 284 MG/DL — HIGH (ref 70–99)
GLUCOSE BLDC GLUCOMTR-MCNC: 284 MG/DL — HIGH (ref 70–99)
GLUCOSE BLDC GLUCOMTR-MCNC: 303 MG/DL — HIGH (ref 70–99)
GLUCOSE BLDC GLUCOMTR-MCNC: 303 MG/DL — HIGH (ref 70–99)
GLUCOSE BLDC GLUCOMTR-MCNC: 314 MG/DL — HIGH (ref 70–99)
GLUCOSE BLDC GLUCOMTR-MCNC: 314 MG/DL — HIGH (ref 70–99)
GLUCOSE BLDC GLUCOMTR-MCNC: 386 MG/DL — HIGH (ref 70–99)
GLUCOSE BLDC GLUCOMTR-MCNC: 386 MG/DL — HIGH (ref 70–99)
GLUCOSE SERPL-MCNC: 113 MG/DL — HIGH (ref 70–99)
GLUCOSE SERPL-MCNC: 113 MG/DL — HIGH (ref 70–99)
GLUCOSE SERPL-MCNC: 308 MG/DL — HIGH (ref 70–99)
GLUCOSE SERPL-MCNC: 308 MG/DL — HIGH (ref 70–99)
HCT VFR BLD CALC: 32.4 % — LOW (ref 34.5–45)
HCT VFR BLD CALC: 32.4 % — LOW (ref 34.5–45)
HGB BLD-MCNC: 11.3 G/DL — LOW (ref 11.5–15.5)
HGB BLD-MCNC: 11.3 G/DL — LOW (ref 11.5–15.5)
MAGNESIUM SERPL-MCNC: 1.9 MG/DL — SIGNIFICANT CHANGE UP (ref 1.6–2.6)
MAGNESIUM SERPL-MCNC: 1.9 MG/DL — SIGNIFICANT CHANGE UP (ref 1.6–2.6)
MAGNESIUM SERPL-MCNC: 2 MG/DL — SIGNIFICANT CHANGE UP (ref 1.6–2.6)
MAGNESIUM SERPL-MCNC: 2 MG/DL — SIGNIFICANT CHANGE UP (ref 1.6–2.6)
MCHC RBC-ENTMCNC: 33.8 PG — SIGNIFICANT CHANGE UP (ref 27–34)
MCHC RBC-ENTMCNC: 33.8 PG — SIGNIFICANT CHANGE UP (ref 27–34)
MCHC RBC-ENTMCNC: 34.9 GM/DL — SIGNIFICANT CHANGE UP (ref 32–36)
MCHC RBC-ENTMCNC: 34.9 GM/DL — SIGNIFICANT CHANGE UP (ref 32–36)
MCV RBC AUTO: 97 FL — SIGNIFICANT CHANGE UP (ref 80–100)
MCV RBC AUTO: 97 FL — SIGNIFICANT CHANGE UP (ref 80–100)
METHADONE UR-MCNC: NEGATIVE — SIGNIFICANT CHANGE UP
METHADONE UR-MCNC: NEGATIVE — SIGNIFICANT CHANGE UP
OPIATES UR-MCNC: NEGATIVE — SIGNIFICANT CHANGE UP
OPIATES UR-MCNC: NEGATIVE — SIGNIFICANT CHANGE UP
PCP SPEC-MCNC: SIGNIFICANT CHANGE UP
PCP SPEC-MCNC: SIGNIFICANT CHANGE UP
PCP UR-MCNC: NEGATIVE — SIGNIFICANT CHANGE UP
PCP UR-MCNC: NEGATIVE — SIGNIFICANT CHANGE UP
PHOSPHATE SERPL-MCNC: 2.5 MG/DL — SIGNIFICANT CHANGE UP (ref 2.4–4.7)
PHOSPHATE SERPL-MCNC: 2.5 MG/DL — SIGNIFICANT CHANGE UP (ref 2.4–4.7)
PHOSPHATE SERPL-MCNC: 2.7 MG/DL — SIGNIFICANT CHANGE UP (ref 2.4–4.7)
PHOSPHATE SERPL-MCNC: 2.7 MG/DL — SIGNIFICANT CHANGE UP (ref 2.4–4.7)
PLATELET # BLD AUTO: 107 K/UL — LOW (ref 150–400)
PLATELET # BLD AUTO: 107 K/UL — LOW (ref 150–400)
POTASSIUM SERPL-MCNC: 4.2 MMOL/L — SIGNIFICANT CHANGE UP (ref 3.5–5.3)
POTASSIUM SERPL-MCNC: 4.2 MMOL/L — SIGNIFICANT CHANGE UP (ref 3.5–5.3)
POTASSIUM SERPL-MCNC: 4.5 MMOL/L — SIGNIFICANT CHANGE UP (ref 3.5–5.3)
POTASSIUM SERPL-MCNC: 4.5 MMOL/L — SIGNIFICANT CHANGE UP (ref 3.5–5.3)
POTASSIUM SERPL-SCNC: 4.2 MMOL/L — SIGNIFICANT CHANGE UP (ref 3.5–5.3)
POTASSIUM SERPL-SCNC: 4.2 MMOL/L — SIGNIFICANT CHANGE UP (ref 3.5–5.3)
POTASSIUM SERPL-SCNC: 4.5 MMOL/L — SIGNIFICANT CHANGE UP (ref 3.5–5.3)
POTASSIUM SERPL-SCNC: 4.5 MMOL/L — SIGNIFICANT CHANGE UP (ref 3.5–5.3)
PROT SERPL-MCNC: 6 G/DL — LOW (ref 6.6–8.7)
PROT SERPL-MCNC: 6 G/DL — LOW (ref 6.6–8.7)
RBC # BLD: 3.34 M/UL — LOW (ref 3.8–5.2)
RBC # BLD: 3.34 M/UL — LOW (ref 3.8–5.2)
RBC # FLD: 13.5 % — SIGNIFICANT CHANGE UP (ref 10.3–14.5)
RBC # FLD: 13.5 % — SIGNIFICANT CHANGE UP (ref 10.3–14.5)
SODIUM SERPL-SCNC: 128 MMOL/L — LOW (ref 135–145)
SODIUM SERPL-SCNC: 128 MMOL/L — LOW (ref 135–145)
SODIUM SERPL-SCNC: 136 MMOL/L — SIGNIFICANT CHANGE UP (ref 135–145)
SODIUM SERPL-SCNC: 136 MMOL/L — SIGNIFICANT CHANGE UP (ref 135–145)
THC UR QL: POSITIVE
THC UR QL: POSITIVE
TRIGL SERPL-MCNC: 1045 MG/DL — HIGH
TRIGL SERPL-MCNC: 1045 MG/DL — HIGH
TRIGL SERPL-MCNC: 1204 MG/DL — HIGH
TRIGL SERPL-MCNC: 1204 MG/DL — HIGH
WBC # BLD: 5.74 K/UL — SIGNIFICANT CHANGE UP (ref 3.8–10.5)
WBC # BLD: 5.74 K/UL — SIGNIFICANT CHANGE UP (ref 3.8–10.5)
WBC # FLD AUTO: 5.74 K/UL — SIGNIFICANT CHANGE UP (ref 3.8–10.5)
WBC # FLD AUTO: 5.74 K/UL — SIGNIFICANT CHANGE UP (ref 3.8–10.5)

## 2023-12-23 PROCEDURE — 99233 SBSQ HOSP IP/OBS HIGH 50: CPT

## 2023-12-23 PROCEDURE — 99232 SBSQ HOSP IP/OBS MODERATE 35: CPT | Mod: GC

## 2023-12-23 PROCEDURE — 99254 IP/OBS CNSLTJ NEW/EST MOD 60: CPT

## 2023-12-23 RX ORDER — INSULIN LISPRO 100/ML
7 VIAL (ML) SUBCUTANEOUS
Refills: 0 | Status: DISCONTINUED | OUTPATIENT
Start: 2023-12-23 | End: 2023-12-24

## 2023-12-23 RX ORDER — INSULIN LISPRO 100/ML
5 VIAL (ML) SUBCUTANEOUS
Refills: 0 | Status: DISCONTINUED | OUTPATIENT
Start: 2023-12-23 | End: 2023-12-23

## 2023-12-23 RX ADMIN — Medication 1 TABLET(S): at 11:52

## 2023-12-23 RX ADMIN — Medication 100 MILLIGRAM(S): at 11:51

## 2023-12-23 RX ADMIN — Medication 1 MILLIGRAM(S): at 11:52

## 2023-12-23 RX ADMIN — Medication 1 MILLIGRAM(S): at 05:36

## 2023-12-23 RX ADMIN — Medication 145 MILLIGRAM(S): at 11:52

## 2023-12-23 RX ADMIN — Medication 1 MILLIGRAM(S): at 16:49

## 2023-12-23 RX ADMIN — INSULIN HUMAN 5 UNIT(S): 100 INJECTION, SOLUTION SUBCUTANEOUS at 01:11

## 2023-12-23 RX ADMIN — Medication 1 MILLIGRAM(S): at 13:40

## 2023-12-23 RX ADMIN — Medication 1 MILLIGRAM(S): at 02:28

## 2023-12-23 RX ADMIN — Medication 2 GRAM(S): at 16:11

## 2023-12-23 RX ADMIN — CHLORHEXIDINE GLUCONATE 1 APPLICATION(S): 213 SOLUTION TOPICAL at 06:19

## 2023-12-23 RX ADMIN — Medication 8: at 16:45

## 2023-12-23 RX ADMIN — Medication 2 GRAM(S): at 05:36

## 2023-12-23 RX ADMIN — GABAPENTIN 100 MILLIGRAM(S): 400 CAPSULE ORAL at 05:37

## 2023-12-23 RX ADMIN — Medication 7 UNIT(S): at 16:45

## 2023-12-23 RX ADMIN — ATORVASTATIN CALCIUM 20 MILLIGRAM(S): 80 TABLET, FILM COATED ORAL at 21:24

## 2023-12-23 RX ADMIN — Medication 30 MILLILITER(S): at 11:52

## 2023-12-23 RX ADMIN — GABAPENTIN 100 MILLIGRAM(S): 400 CAPSULE ORAL at 13:40

## 2023-12-23 RX ADMIN — Medication 1 MILLIGRAM(S): at 10:37

## 2023-12-23 RX ADMIN — Medication 1 MILLIGRAM(S): at 21:24

## 2023-12-23 RX ADMIN — PANTOPRAZOLE SODIUM 40 MILLIGRAM(S): 20 TABLET, DELAYED RELEASE ORAL at 16:11

## 2023-12-23 RX ADMIN — Medication 10: at 11:49

## 2023-12-23 RX ADMIN — ATORVASTATIN CALCIUM 20 MILLIGRAM(S): 80 TABLET, FILM COATED ORAL at 00:49

## 2023-12-23 RX ADMIN — Medication 4: at 21:26

## 2023-12-23 RX ADMIN — Medication 6: at 07:53

## 2023-12-23 RX ADMIN — PANTOPRAZOLE SODIUM 40 MILLIGRAM(S): 20 TABLET, DELAYED RELEASE ORAL at 05:37

## 2023-12-23 RX ADMIN — GABAPENTIN 100 MILLIGRAM(S): 400 CAPSULE ORAL at 21:25

## 2023-12-23 RX ADMIN — INSULIN GLARGINE 16 UNIT(S): 100 INJECTION, SOLUTION SUBCUTANEOUS at 07:54

## 2023-12-23 NOTE — CONSULT NOTE ADULT - PROBLEM SELECTOR RECOMMENDATION 9
-Having intermittent Mobitz I and ST episodes  -Has standing SR 1st  -Asymptomatic   -Was told by her cardiologist she will eventually need a PPM  -No further inpatient work up

## 2023-12-23 NOTE — CONSULT NOTE ADULT - TIME BILLING
reviewing labs, notes, orders, radiographic studies, as well as counseling and coordinating care with the relevant multidisciplinary team, including with the primary and consulting providers.
Chronic alcohol abuse, Severe HLD, 1st and 2nd degree heart blocks

## 2023-12-23 NOTE — CHART NOTE - NSCHARTNOTEFT_GEN_A_CORE
28F PMH IDDM1, EtOH use, congenital heart block, hypoglycemic seizures who presented 12/21/23 after a witnessed seizure. She reports this usually occurs when she is hypoglycemic (around 70s). She drinks 2 pints of Vodka daily, and has been trying to cut down. She was started on Ativan taper in the ED. Noted on labs to have TG >4000, and started on insulin gtt and admitted to ICU. Lipase was negative, no significant signs of pancreatitis. Given episodes of hypo and hyperglycemia, insulin gtt held and resumed on home Lantus 16U and premeal admelog 5 units. Currently receiving fenofibrate. Transaminitis noted on CMP atorvastatin started at low dose of 20mg, Atorvastatin to be gradually increased after noting improvement in LFT's. Pt receiving ativan taper for withdrawal, CIWA zero today. Pt transferred to medical service under Dr. Banuelos.     T(C): 36.9 (12-23-23 @ 11:05), Max: 37.5 (12-22-23 @ 16:05)  HR: 118 (12-23-23 @ 10:00) (48 - 145)  BP: 118/89 (12-23-23 @ 10:00) (90/62 - 123/91)  RR: 20 (12-23-23 @ 10:00) (16 - 25)  SpO2: 98% (12-23-23 @ 10:00) (93% - 100%)    CONSTITUTIONAL: Well groomed, no apparent distress  No conjunctival or scleral injection, non-icteric  ENMT: Oral mucosa with moist membranes.   NECK: Supple, symmetric and without tracheal deviation   RESP: No respiratory distress, no use of accessory muscles; CTA b/l, no WRR  CV: RRR, +S1S2, no MRG; no JVD; no peripheral edema  GI: Soft, NT, ND, no rebound, no guarding; no palpable masses  MSK: Normal gait  SKIN: No rashes or ulcers noted; no subcutaneous nodules or induration palpable  NEURO: CN II-XII intact; normal reflexes in upper and lower extremities, sensation intact in upper and lower extremities b/l to light touch   PSYCH: Appropriate insight/judgment; A+O x 3, mood and affect appropriate, recent/remote memory intact BRIEF HOSPITAL COURSE    28F PMH IDDM1, EtOH use, congenital heart block, hypoglycemic seizures who presented 12/21/23 after a witnessed seizure. She reports this usually occurs when she is hypoglycemic (around 70s). She drinks 2 pints of Vodka daily, and has been trying to cut down. She was started on Ativan taper in the ED. Noted on labs to have TG >4000, and started on insulin gtt and admitted to ICU. Lipase was negative, no significant signs of pancreatitis. Given episodes of hypo and hyperglycemia, insulin gtt held and resumed on home Lantus 16U and premeal admelog 5 units. Currently receiving fenofibrate. Transaminitis noted on CMP atorvastatin started at low dose of 20mg, Atorvastatin to be gradually increased after noting improvement in LFT's. Pt receiving ativan taper for withdrawal, CIWA zero today. Pt transferred to medical service under Dr. Banuelos.     T(C): 36.9 (12-23-23 @ 11:05), Max: 37.5 (12-22-23 @ 16:05)  HR: 118 (12-23-23 @ 10:00) (48 - 145)  BP: 118/89 (12-23-23 @ 10:00) (90/62 - 123/91)  RR: 20 (12-23-23 @ 10:00) (16 - 25)  SpO2: 98% (12-23-23 @ 10:00) (93% - 100%)    CONSTITUTIONAL: Well groomed, no apparent distress  No conjunctival or scleral injection, non-icteric  ENMT: Oral mucosa with moist membranes.   NECK: Supple, symmetric and without tracheal deviation   RESP: No respiratory distress, no use of accessory muscles; CTA b/l, no WRR  CV: RRR, +S1S2, no MRG; no JVD; no peripheral edema  GI: Soft, NT, ND, no rebound, no guarding; no palpable masses  MSK: Normal gait  SKIN: No rashes or ulcers noted; no subcutaneous nodules or induration palpable  NEURO: CN II-XII intact; normal reflexes in upper and lower extremities, sensation intact in upper and lower extremities b/l to light touch   PSYCH: Appropriate insight/judgment; A+O x 3, mood and affect appropriate, recent/remote memory intact    Assessment and plan  28F PMH IDDM1, EtOH use, congenital heart block, hypoglycemic seizures who presented 12/21/23 after a witnessed seizure 2/2 hypoglycemia admitted to ICU for hypotriglyceridemia.     Hypertriglyeridemia  - No evidence of pancreatitis  - Insulin GTT stopped on 12/22  - C/w Fenofibrate 145mg PO QDaily  - C/w Lovaza 2g PO BID  - Started  - Downtrending TG level   - F/u with endocrinology  - Should see nutritionist as outpatient    EtOH use  Witnessed seizure - likely 2/2 EtOH withdrawal  H/o hypoglycemic seizures  - C/w Ativan taper  - Thiamine/Folic acid/MVI  - Trend CIWA    IDDM1  - Resumed Lantus 16U    Diet - Consistent carb BRIEF HOSPITAL COURSE    28F PMH IDDM1, EtOH use, congenital heart block, hypoglycemic seizures who presented 12/21/23 after a witnessed seizure. She reports this usually occurs when she is hypoglycemic (around 70s). She drinks 2 pints of Vodka daily, and has been trying to cut down. She was started on Ativan taper in the ED. Noted on labs to have TG >4000, and started on insulin gtt and admitted to ICU. Lipase was negative, no significant signs of pancreatitis. Given episodes of hypo and hyperglycemia, insulin gtt held and resumed on home Lantus 16U and premeal admelog 5 units. Currently receiving fenofibrate. Transaminitis noted on CMP atorvastatin started at low dose of 20mg, Atorvastatin to be gradually increased after noting improvement in LFT's. Pt receiving ativan taper for withdrawal, CIWA zero today. Pt transferred to medical service under Dr. Banuelos.     T(C): 36.9 (12-23-23 @ 11:05), Max: 37.5 (12-22-23 @ 16:05)  HR: 118 (12-23-23 @ 10:00) (48 - 145)  BP: 118/89 (12-23-23 @ 10:00) (90/62 - 123/91)  RR: 20 (12-23-23 @ 10:00) (16 - 25)  SpO2: 98% (12-23-23 @ 10:00) (93% - 100%)    CONSTITUTIONAL: Well groomed, no apparent distress  No conjunctival or scleral injection, non-icteric  ENMT: Oral mucosa with moist membranes.   NECK: Supple, symmetric and without tracheal deviation   RESP: No respiratory distress, no use of accessory muscles; CTA b/l, no WRR  CV: RRR, +S1S2, no MRG; no JVD; no peripheral edema  GI: Soft, NT, ND, no rebound, no guarding  MSK: Normal gait  SKIN: No rashes or ulcers noted  NEURO: CN II-XII intact; normal reflexes in upper and lower extremities, sensation intact in upper and lower extremities b/l to light touch   PSYCH: Appropriate insight/judgment; A+O x 3, mood and affect appropriate, recent/remote memory intact    Assessment and plan  28F PMH IDDM1, EtOH use, congenital heart block, hypoglycemic seizures who presented 12/21/23 after a witnessed seizure 2/2 hypoglycemia admitted to ICU for hypertriglyceridemia.     Hypertriglyceridemia  - No evidence of pancreatitis  - Insulin GTT stopped on 12/22  - C/w Fenofibrate 145mg PO QDaily  - C/w Lovaza 2g PO BID  - Lipitor started at 20mg on 12/23   - To trend LFT's due to transaminitis  - Downtrending TG level   - F/u with endocrinology  - Should see nutritionist as outpatient    EtOH use  Witnessed seizure - likely 2/2 EtOH withdrawal  H/o hypoglycemic seizures  - C/w Ativan taper  - Thiamine/Folic acid/MVI  - Trend CIWA    IDDM1  - Resumed Lantus 16U  - Admelog 5 mg premeal TID and ISS  - Endocrine reccs appreciated    Diet - Consistent carb BRIEF HOSPITAL COURSE    28F PMH IDDM1, EtOH use, congenital heart block, hypoglycemic seizures who presented 12/21/23 after a witnessed seizure. She reports this usually occurs when she is hypoglycemic (around 70s). She drinks 2 pints of Vodka daily, and has been trying to cut down. She was started on Ativan taper in the ED. Noted on labs to have TG >4000, and started on insulin gtt and admitted to ICU. Lipase was negative, no significant signs of pancreatitis. Given episodes of hypo and hyperglycemia, insulin gtt held and resumed on home Lantus 16U and premeal admelog 5 units. Currently receiving fenofibrate. Transaminitis noted on CMP atorvastatin started at low dose of 20mg, Atorvastatin to be gradually increased after noting improvement in LFT's. Pt receiving ativan taper for withdrawal, CIWA zero today. Pt transferred to medical service under Dr. Banuelos.     T(C): 36.9 (12-23-23 @ 11:05), Max: 37.5 (12-22-23 @ 16:05)  HR: 118 (12-23-23 @ 10:00) (48 - 145)  BP: 118/89 (12-23-23 @ 10:00) (90/62 - 123/91)  RR: 20 (12-23-23 @ 10:00) (16 - 25)  SpO2: 98% (12-23-23 @ 10:00) (93% - 100%)    CONSTITUTIONAL: Well groomed, no apparent distress  No conjunctival or scleral injection, non-icteric  ENMT: Oral mucosa with moist membranes.   NECK: Supple, symmetric and without tracheal deviation   RESP: No respiratory distress, no use of accessory muscles; CTA b/l, no WRR  CV: RRR, +S1S2, no MRG; no JVD; no peripheral edema  GI: Soft, NT, ND, no rebound, no guarding  MSK: Normal gait  SKIN: No rashes or ulcers noted  NEURO: CN II-XII intact; normal reflexes in upper and lower extremities, sensation intact in upper and lower extremities b/l to light touch   PSYCH: Appropriate insight/judgment; A+O x 3, mood and affect appropriate, recent/remote memory intact    Assessment and plan  28F PMH IDDM1, EtOH use, congenital heart block, hypoglycemic seizures who presented 12/21/23 after a witnessed seizure 2/2 hypoglycemia admitted to ICU for hypertriglyceridemia.     Hypertriglyceridemia  - No evidence of pancreatitis  - Insulin GTT stopped on 12/22  - C/w Fenofibrate 145mg PO QDaily  - C/w Lovaza 2g PO BID  - Lipitor started at 20mg on 12/23   - To trend LFT's due to transaminitis  - Downtrending TG level   - F/u with endocrinology  - Should see nutritionist as outpatient    EtOH use  Witnessed seizure - likely 2/2 EtOH withdrawal  H/o hypoglycemic seizures  - C/w Ativan taper  - Thiamine/Folic acid/MVI  - Trend CIWA    IDDM1  - Resumed Lantus 16U  - Admelog 5 mg premeal TID and ISS  - Endocrine reccs appreciated    Diet - Consistent carb        PCCM Attending Attestation:    Patient was seen and examined at the bedside. I was physically present for the key portions of the evaluation and management (E/M) service provided. Agree with history, physical exam, assessment, and plan as outlined by resident note above, with the following additions and/or comments:    27 y/o F with hx of type 1 DM, congenital heart block, hx of seizure (hypoglycemic induce?) present with witness seizure, likely hypoglycemic induced.  However she also drinks 2 pint of vodka daily, concern for alcoholic withdrawal.  Started on ativan taper, thaimine, folate and MVI, while clinically improved, patient also incidentally found to have tg > 4000, without sign of pancreatitis, (lipase 20), patient started on insulin drip, fenofibrate, and lipitor.  Endocrine consulted.  At this time, lipitor remains at 20 mg, did not get increase due to mild transaminitis.  No anion gap, not in DKA.  Started on Lantus and premeal insulin to help with glycemic control.  last tg level still 1000 but downtrending.  Overall patient is clinically improved, no further seizure episode.  Patient is clinically stable to downgrade to medical floor.          Ta Hameed M.D.  Attending Physician  [facility]   Pulmonary & Critical Care Medicine    A total of 55 minutes were spent on the entire encounter. Greater than 50% of the time was spent reviewing labs, notes, orders, radiographic studies, as well as counseling and coordinating care with the relevant multidisciplinary team, including with the primary and consulting providers. BRIEF HOSPITAL COURSE    28F PMH IDDM1, EtOH use, congenital heart block, hypoglycemic seizures who presented 12/21/23 after a witnessed seizure. She reports this usually occurs when she is hypoglycemic (around 70s). She drinks 2 pints of Vodka daily, and has been trying to cut down. She was started on Ativan taper in the ED. Noted on labs to have TG >4000, and started on insulin gtt and admitted to ICU. Lipase was negative, no significant signs of pancreatitis. Given episodes of hypo and hyperglycemia, insulin gtt held and resumed on home Lantus 16U and premeal admelog 5 units. Currently receiving fenofibrate. Transaminitis noted on CMP atorvastatin started at low dose of 20mg, Atorvastatin to be gradually increased after noting improvement in LFT's. Pt receiving ativan taper for withdrawal, CIWA zero today. Pt transferred to medical service under Dr. Banuelos.     T(C): 36.9 (12-23-23 @ 11:05), Max: 37.5 (12-22-23 @ 16:05)  HR: 118 (12-23-23 @ 10:00) (48 - 145)  BP: 118/89 (12-23-23 @ 10:00) (90/62 - 123/91)  RR: 20 (12-23-23 @ 10:00) (16 - 25)  SpO2: 98% (12-23-23 @ 10:00) (93% - 100%)    CONSTITUTIONAL: Well groomed, no apparent distress  No conjunctival or scleral injection, non-icteric  ENMT: Oral mucosa with moist membranes.   NECK: Supple, symmetric and without tracheal deviation   RESP: No respiratory distress, no use of accessory muscles; CTA b/l, no WRR  CV: RRR, +S1S2, no MRG; no JVD; no peripheral edema  GI: Soft, NT, ND, no rebound, no guarding  MSK: Normal gait  SKIN: No rashes or ulcers noted  NEURO: CN II-XII intact; normal reflexes in upper and lower extremities, sensation intact in upper and lower extremities b/l to light touch   PSYCH: Appropriate insight/judgment; A+O x 3, mood and affect appropriate, recent/remote memory intact    Assessment and plan  28F PMH IDDM1, EtOH use, congenital heart block, hypoglycemic seizures who presented 12/21/23 after a witnessed seizure 2/2 hypoglycemia admitted to ICU for hypertriglyceridemia.     Hypertriglyceridemia  - No evidence of pancreatitis  - Insulin GTT stopped on 12/22  - C/w Fenofibrate 145mg PO QDaily  - C/w Lovaza 2g PO BID  - Lipitor started at 20mg on 12/23   - To trend LFT's due to transaminitis  - Downtrending TG level   - F/u with endocrinology  - Should see nutritionist as outpatient    EtOH use  Witnessed seizure - likely 2/2 EtOH withdrawal  H/o hypoglycemic seizures  - C/w Ativan taper  - Thiamine/Folic acid/MVI  - Trend CIWA    IDDM1  - Resumed Lantus 16U  - Admelog 5 mg premeal TID and ISS  - Endocrine reccs appreciated    Diet - Consistent carb        PCCM Attending Attestation:    Patient was seen and examined at the bedside. I was physically present for the key portions of the evaluation and management (E/M) service provided. Agree with history, physical exam, assessment, and plan as outlined by resident note above, with the following additions and/or comments:    29 y/o F with hx of type 1 DM, congenital heart block, hx of seizure (hypoglycemic induce?) present with witness seizure, likely hypoglycemic induced.  However she also drinks 2 pint of vodka daily, concern for alcoholic withdrawal.  Started on ativan taper, thaimine, folate and MVI, while clinically improved, patient also incidentally found to have tg > 4000, without sign of pancreatitis, (lipase 20), patient started on insulin drip, fenofibrate, and lipitor.  Endocrine consulted.  At this time, lipitor remains at 20 mg, did not get increase due to mild transaminitis.  No anion gap, not in DKA.  Started on Lantus and premeal insulin to help with glycemic control.  last tg level still 1000 but downtrending.  Overall patient is clinically improved, no further seizure episode.  Patient is clinically stable to downgrade to medical floor.          Ta Hameed M.D.  Attending Physician  [facility]   Pulmonary & Critical Care Medicine    A total of 55 minutes were spent on the entire encounter. Greater than 50% of the time was spent reviewing labs, notes, orders, radiographic studies, as well as counseling and coordinating care with the relevant multidisciplinary team, including with the primary and consulting providers.

## 2023-12-23 NOTE — CONSULT NOTE ADULT - PROBLEM SELECTOR RECOMMENDATION 2
-Euvolemic. Not in HF  -No prior history of HF, takes no cardiac meds at home  -States she had normal NST last year in [place]  -TTE as above. Shows hyperdynamic   -BNP not obtained  -CXR clear  -Trop < 15

## 2023-12-23 NOTE — PROGRESS NOTE ADULT - SUBJECTIVE AND OBJECTIVE BOX
MICU downgrade / hospital course:   29 y/o F from [place] w/ PMH of ETOH abuse (hx of withdrawal seizrues in the past but no DTs), IDDM (has reji 3 and uses basal/bolus pen regimen), congenital heart block was BIBA after having a witnessed seizure.  Pt reports at baseline she drinks 2pints of vodka daily but tried cutting a few days prior to this event.  On arrival pt was found to have Na 118 and TG of >4000.  Endocrineology was consulted and pt was placed on insulin gtt and admitted to MICU.  Pt had no abd pain and lipase was 20.  w/ the insulin gtt TG have steadily improved and now are 1045.  Endo transitioned pt to basal/bolus regimen and started pt on fenofibrate and lovaza.  Pt is on an ativan taper for ETOH withdrawal.  Pt is medically stable for down grade to medicine.          SUBJECTIVE / OVERNIGHT EVENTS:  No acute events reported overnight.  Pt offers no acute complaints at this time.       I&O's Summary    22 Dec 2023 07:01  -  23 Dec 2023 07:00  --------------------------------------------------------  IN: 228 mL / OUT: 850 mL / NET: -622 mL          PHYSICAL EXAM:  Vital Signs Last 24 Hrs  T(C): 36.9 (23 Dec 2023 11:05), Max: 37.5 (22 Dec 2023 16:05)  T(F): 98.4 (23 Dec 2023 11:05), Max: 99.5 (22 Dec 2023 16:05)  HR: 126 (23 Dec 2023 13:00) (48 - 145)  BP: 121/91 (23 Dec 2023 13:00) (90/62 - 123/91)  BP(mean): 102 (23 Dec 2023 13:00) (68 - 102)  RR: 22 (23 Dec 2023 13:00) (16 - 25)  SpO2: 100% (23 Dec 2023 13:00) (93% - 100%)    Parameters below as of 23 Dec 2023 08:00  Patient On (Oxygen Delivery Method): room air        GENERAL: pt examined bedside, laying comfortably in bed in NAD  HEENT: NC/AT, moist oral mucosa, clear conjunctiva, sclera nonicteric  RESPIRATORY: Normal respiratory effort, no wheezing, rhonchi, rales  CARDIOVASCULAR: RRR, normal S1 and S2  ABDOMEN: soft, NT/ND, normoactive bowel sounds, no rebound/guarding  EXTREMITIES: No cynaosis, no clubbing, no lower extremity edema  NEUROLOGY: A+Ox3, +tremors, no focal neurologic deficits appreciated   SKIN: No rashes or no palpable lesions        LABS:                        11.3   5.74  )-----------( 107      ( 23 Dec 2023 02:43 )             32.4     12-23    136  |  102  |  15.6  ----------------------------<  113<H>  4.2   |  26.0  |  0.84    Ca    8.0<L>      23 Dec 2023 02:43  Phos  2.7     12-23  Mg     2.0     12-23    TPro  5.7<L>  /  Alb  2.8<L>  /  TBili  1.2  /  DBili  x   /  AST  98<H>  /  ALT  42<H>  /  AlkPhos  220<H>  12-22          Urinalysis Basic - ( 23 Dec 2023 02:43 )    Color: x / Appearance: x / SG: x / pH: x  Gluc: 113 mg/dL / Ketone: x  / Bili: x / Urobili: x   Blood: x / Protein: x / Nitrite: x   Leuk Esterase: x / RBC: x / WBC x   Sq Epi: x / Non Sq Epi: x / Bacteria: x        CAPILLARY BLOOD GLUCOSE      POCT Blood Glucose.: 386 mg/dL (23 Dec 2023 11:29)  POCT Blood Glucose.: 284 mg/dL (23 Dec 2023 07:42)  POCT Blood Glucose.: 178 mg/dL (23 Dec 2023 01:08)  POCT Blood Glucose.: 362 mg/dL (22 Dec 2023 21:35)  POCT Blood Glucose.: 153 mg/dL (22 Dec 2023 16:15)  POCT Blood Glucose.: 316 mg/dL (22 Dec 2023 15:06)  POCT Blood Glucose.: 439 mg/dL (22 Dec 2023 14:01)        RADIOLOGY & ADDITIONAL TESTS:      < from: Xray Chest 1 View- PORTABLE-Urgent (Xray Chest 1 View- PORTABLE-Urgent .) (12.20.23 @ 20:41) >  IMPRESSION:   No radiographic evidence of active chest disease.    < end of copied text >        < from: TTE Limited W or WO Ultrasound Enhancing Agent (12.21.23 @ 18:39) >   1. Left ventricular systolic function is hyperdynamic with an ejection fraction of 76 % by Ling's method of disks.   2. Normal right ventricular cavity size and systolic function.   3. No prior echocardiogram is available for comparison.    < end of copied text >      MEDICATIONS  (STANDING):  atorvastatin 20 milliGRAM(s) Oral at bedtime  chlorhexidine 2% Cloths 1 Application(s) Topical <User Schedule>  dextrose 50% Injectable 25 Gram(s) IV Push once  dextrose 50% Injectable 25 Gram(s) IV Push once  dextrose 50% Injectable 12.5 Gram(s) IV Push once  fenofibrate Tablet 145 milliGRAM(s) Oral daily  folic acid 1 milliGRAM(s) Oral daily  gabapentin 100 milliGRAM(s) Oral three times a day  glucagon  Injectable 1 milliGRAM(s) IntraMuscular once  influenza   Vaccine 0.5 milliLiter(s) IntraMuscular once  insulin glargine Injectable (LANTUS) 16 Unit(s) SubCutaneous every morning  insulin lispro (ADMELOG) corrective regimen sliding scale   SubCutaneous at bedtime  insulin lispro (ADMELOG) corrective regimen sliding scale   SubCutaneous three times a day before meals  insulin lispro Injectable (ADMELOG) 5 Unit(s) SubCutaneous three times a day before meals  LORazepam     Tablet   Oral   LORazepam     Tablet 1 milliGRAM(s) Oral every 4 hours  multivitamin 1 Tablet(s) Oral daily  omega-3-Acid Ethyl Esters 2 Gram(s) Oral two times a day  pantoprazole  Injectable 40 milliGRAM(s) IV Push two times a day  thiamine 100 milliGRAM(s) Oral daily    MEDICATIONS  (PRN):  acetaminophen     Tablet .. 650 milliGRAM(s) Oral every 6 hours PRN Temp greater or equal to 38C (100.4F), Mild Pain (1 - 3)  aluminum hydroxide/magnesium hydroxide/simethicone Suspension 30 milliLiter(s) Oral every 4 hours PRN Dyspepsia  dextrose Oral Gel 15 Gram(s) Oral once PRN Blood Glucose LESS THAN 70 milliGRAM(s)/deciliter  LORazepam   Injectable 2 milliGRAM(s) IV Push every 1 hour PRN Symptom-triggered: each CIWA -Ar score 8 or GREATER  melatonin 3 milliGRAM(s) Oral at bedtime PRN Insomnia  ondansetron Injectable 4 milliGRAM(s) IV Push every 8 hours PRN Nausea and/or Vomiting                                   MICU downgrade / hospital course:   27 y/o F from [place] w/ PMH of ETOH abuse (hx of withdrawal seizrues in the past but no DTs), IDDM (has reji 3 and uses basal/bolus pen regimen), congenital heart block was BIBA after having a witnessed seizure.  Pt reports at baseline she drinks 2pints of vodka daily but tried cutting a few days prior to this event.  On arrival pt was found to have Na 118 and TG of >4000.  Na was pseudohyponatremia due to hypertriglyceridemia.   Endocrineology was consulted and pt was placed on insulin gtt and admitted to MICU.  Pt had no abd pain and lipase was 20.  w/ the insulin gtt TG have steadily improved and now are 1045.  Endo transitioned pt to basal/bolus regimen and started pt on fenofibrate and lovaza.  Pt is on an ativan taper for ETOH withdrawal.  Pt is medically stable for down grade to medicine.          SUBJECTIVE / OVERNIGHT EVENTS:  No acute events reported overnight.  Pt offers no acute complaints at this time.       I&O's Summary    22 Dec 2023 07:01  -  23 Dec 2023 07:00  --------------------------------------------------------  IN: 228 mL / OUT: 850 mL / NET: -622 mL          PHYSICAL EXAM:  Vital Signs Last 24 Hrs  T(C): 36.9 (23 Dec 2023 11:05), Max: 37.5 (22 Dec 2023 16:05)  T(F): 98.4 (23 Dec 2023 11:05), Max: 99.5 (22 Dec 2023 16:05)  HR: 126 (23 Dec 2023 13:00) (48 - 145)  BP: 121/91 (23 Dec 2023 13:00) (90/62 - 123/91)  BP(mean): 102 (23 Dec 2023 13:00) (68 - 102)  RR: 22 (23 Dec 2023 13:00) (16 - 25)  SpO2: 100% (23 Dec 2023 13:00) (93% - 100%)    Parameters below as of 23 Dec 2023 08:00  Patient On (Oxygen Delivery Method): room air        GENERAL: pt examined bedside, laying comfortably in bed in NAD  HEENT: NC/AT, moist oral mucosa, clear conjunctiva, sclera nonicteric  RESPIRATORY: Normal respiratory effort, no wheezing, rhonchi, rales  CARDIOVASCULAR: RRR, normal S1 and S2  ABDOMEN: soft, NT/ND, normoactive bowel sounds, no rebound/guarding  EXTREMITIES: No cynaosis, no clubbing, no lower extremity edema  NEUROLOGY: A+Ox3, +tremors, no focal neurologic deficits appreciated   SKIN: No rashes or no palpable lesions        LABS:                        11.3   5.74  )-----------( 107      ( 23 Dec 2023 02:43 )             32.4     12-23    136  |  102  |  15.6  ----------------------------<  113<H>  4.2   |  26.0  |  0.84    Ca    8.0<L>      23 Dec 2023 02:43  Phos  2.7     12-23  Mg     2.0     12-23    TPro  5.7<L>  /  Alb  2.8<L>  /  TBili  1.2  /  DBili  x   /  AST  98<H>  /  ALT  42<H>  /  AlkPhos  220<H>  12-22          Urinalysis Basic - ( 23 Dec 2023 02:43 )    Color: x / Appearance: x / SG: x / pH: x  Gluc: 113 mg/dL / Ketone: x  / Bili: x / Urobili: x   Blood: x / Protein: x / Nitrite: x   Leuk Esterase: x / RBC: x / WBC x   Sq Epi: x / Non Sq Epi: x / Bacteria: x        CAPILLARY BLOOD GLUCOSE      POCT Blood Glucose.: 386 mg/dL (23 Dec 2023 11:29)  POCT Blood Glucose.: 284 mg/dL (23 Dec 2023 07:42)  POCT Blood Glucose.: 178 mg/dL (23 Dec 2023 01:08)  POCT Blood Glucose.: 362 mg/dL (22 Dec 2023 21:35)  POCT Blood Glucose.: 153 mg/dL (22 Dec 2023 16:15)  POCT Blood Glucose.: 316 mg/dL (22 Dec 2023 15:06)  POCT Blood Glucose.: 439 mg/dL (22 Dec 2023 14:01)        RADIOLOGY & ADDITIONAL TESTS:      < from: Xray Chest 1 View- PORTABLE-Urgent (Xray Chest 1 View- PORTABLE-Urgent .) (12.20.23 @ 20:41) >  IMPRESSION:   No radiographic evidence of active chest disease.    < end of copied text >        < from: TTE Limited W or WO Ultrasound Enhancing Agent (12.21.23 @ 18:39) >   1. Left ventricular systolic function is hyperdynamic with an ejection fraction of 76 % by Ling's method of disks.   2. Normal right ventricular cavity size and systolic function.   3. No prior echocardiogram is available for comparison.    < end of copied text >      MEDICATIONS  (STANDING):  atorvastatin 20 milliGRAM(s) Oral at bedtime  chlorhexidine 2% Cloths 1 Application(s) Topical <User Schedule>  dextrose 50% Injectable 25 Gram(s) IV Push once  dextrose 50% Injectable 25 Gram(s) IV Push once  dextrose 50% Injectable 12.5 Gram(s) IV Push once  fenofibrate Tablet 145 milliGRAM(s) Oral daily  folic acid 1 milliGRAM(s) Oral daily  gabapentin 100 milliGRAM(s) Oral three times a day  glucagon  Injectable 1 milliGRAM(s) IntraMuscular once  influenza   Vaccine 0.5 milliLiter(s) IntraMuscular once  insulin glargine Injectable (LANTUS) 16 Unit(s) SubCutaneous every morning  insulin lispro (ADMELOG) corrective regimen sliding scale   SubCutaneous at bedtime  insulin lispro (ADMELOG) corrective regimen sliding scale   SubCutaneous three times a day before meals  insulin lispro Injectable (ADMELOG) 5 Unit(s) SubCutaneous three times a day before meals  LORazepam     Tablet   Oral   LORazepam     Tablet 1 milliGRAM(s) Oral every 4 hours  multivitamin 1 Tablet(s) Oral daily  omega-3-Acid Ethyl Esters 2 Gram(s) Oral two times a day  pantoprazole  Injectable 40 milliGRAM(s) IV Push two times a day  thiamine 100 milliGRAM(s) Oral daily    MEDICATIONS  (PRN):  acetaminophen     Tablet .. 650 milliGRAM(s) Oral every 6 hours PRN Temp greater or equal to 38C (100.4F), Mild Pain (1 - 3)  aluminum hydroxide/magnesium hydroxide/simethicone Suspension 30 milliLiter(s) Oral every 4 hours PRN Dyspepsia  dextrose Oral Gel 15 Gram(s) Oral once PRN Blood Glucose LESS THAN 70 milliGRAM(s)/deciliter  LORazepam   Injectable 2 milliGRAM(s) IV Push every 1 hour PRN Symptom-triggered: each CIWA -Ar score 8 or GREATER  melatonin 3 milliGRAM(s) Oral at bedtime PRN Insomnia  ondansetron Injectable 4 milliGRAM(s) IV Push every 8 hours PRN Nausea and/or Vomiting

## 2023-12-23 NOTE — CHART NOTE - NSCHARTNOTEFT_GEN_A_CORE
Nutrition Note: Aware pt downgraded to medicine. Chart reviewed; RD to follow up with full nutrition assessment per medical floor protocol.

## 2023-12-23 NOTE — CONSULT NOTE ADULT - SUBJECTIVE AND OBJECTIVE BOX
[facility]                                              CARDIOLOGY AT [place] [address]                                             Telephone: [phone]. 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 Presents with withdrawal and witnessed seizure. Course complicated by elevated triglycerides. Cardiology called regard ?HFpEF. Noted to have mobitz 1 and SR 1st degree on tele. Reports having normal NST in [place] last year. Denies CP, weight gain, syncope, dizziness          CARDIAC TESTING   ECHO:  < from: TTE Limited W or WO Ultrasound Enhancing Agent (12.21.23 @ 18:39) >   1. Left ventricular systolic function is hyperdynamic with an ejection fraction of 76 % by Ling's method of disks.   2. Normal right ventricular cavity size and systolic function.   3. No prior echocardiogram is available for comparison.    < end of copied text >    STRESS:    CATH:     ELECTROPHYSIOLOGY:     PAST MEDICAL HISTORY  Type 2 diabetes mellitus without complication, with long-term current use of insulin    Alcohol abuse        PAST SURGICAL HISTORY      SOCIAL HISTORY:  Denies smoking/drugs  CIGARETTES:     ALCOHOL:  DRUGS:    FAMILY HISTORY:  No pertinent family history in first degree relatives      Family History of Cardiovascular Disease:  Yes [  ] No [ x ]  Coronary Artery Disease in first degree relative: Yes [  ] No [x  ]  Sudden Cardiac Death in First degree relative: Yes [  ] No [  x]    HOME MEDICATIONS:  gabapentin 100 mg oral tablet: orally 3 times a day (21 Dec 2023 04:11)  insulin glargine (concentrated) 300 units/mL subcutaneous solution: 16 unit(s) subcutaneous once a day (21 Dec 2023 04:11)      CURRENT CARDIAC MEDICATIONS:      CURRENT OTHER MEDICATIONS:  acetaminophen     Tablet .. 650 milliGRAM(s) Oral every 6 hours PRN Temp greater or equal to 38C (100.4F), Mild Pain (1 - 3)  gabapentin 100 milliGRAM(s) Oral three times a day  LORazepam     Tablet   Oral   LORazepam     Tablet 1 milliGRAM(s) Oral every 4 hours, Stop order after: 6 Doses  LORazepam   Injectable 2 milliGRAM(s) IV Push every 1 hour PRN Symptom-triggered: each CIWA -Ar score 8 or GREATER  melatonin 3 milliGRAM(s) Oral at bedtime PRN Insomnia  ondansetron Injectable 4 milliGRAM(s) IV Push every 8 hours PRN Nausea and/or Vomiting  aluminum hydroxide/magnesium hydroxide/simethicone Suspension 30 milliLiter(s) Oral every 4 hours PRN Dyspepsia  pantoprazole  Injectable 40 milliGRAM(s) IV Push two times a day  atorvastatin 20 milliGRAM(s) Oral at bedtime  chlorhexidine 2% Cloths 1 Application(s) Topical <User Schedule>  dextrose 50% Injectable 25 Gram(s) IV Push once, Stop order after: 1 Doses  dextrose 50% Injectable 25 Gram(s) IV Push once, Stop order after: 1 Doses  dextrose 50% Injectable 12.5 Gram(s) IV Push once, Stop order after: 1 Doses  dextrose Oral Gel 15 Gram(s) Oral once, Stop order after: 1 Doses PRN Blood Glucose LESS THAN 70 milliGRAM(s)/deciliter  fenofibrate Tablet 145 milliGRAM(s) Oral daily  folic acid 1 milliGRAM(s) Oral daily  glucagon  Injectable 1 milliGRAM(s) IntraMuscular once, Stop order after: 1 Doses  influenza   Vaccine 0.5 milliLiter(s) IntraMuscular once  insulin glargine Injectable (LANTUS) 16 Unit(s) SubCutaneous every morning  insulin lispro (ADMELOG) corrective regimen sliding scale   SubCutaneous three times a day before meals  insulin lispro (ADMELOG) corrective regimen sliding scale   SubCutaneous at bedtime  insulin lispro Injectable (ADMELOG) 7 Unit(s) SubCutaneous three times a day before meals  multivitamin 1 Tablet(s) Oral daily  omega-3-Acid Ethyl Esters 2 Gram(s) Oral two times a day  thiamine 100 milliGRAM(s) Oral daily      ALLERGIES:   No Known Allergies      REVIEW OF SYMPTOMS:   CONSTITUTIONAL: No fever, no chills, no weight loss, no weight gain, no fatigue   ENMT:  No vertigo; No sinus or throat pain  NECK: No pain or stiffness  CARDIOVASCULAR: No chest pain, no dyspnea, no syncope/presyncope, no palpitations, no dizziness, no Orthopnea, no Paroxsymal nocturnal dyspnea  RESPIRATORY: no Shortness of breath, no cough, no wheezing  : No dysuria, no hematuria   GI: No dark color stool, no nausea, no diarrhea, no constipation, no abdominal pain   NEURO: No headache, no slurred speech   MUSCULOSKELETAL: No joint pain or swelling; No muscle, back, or extremity pain  PSYCH: No agitation, no anxiety.    ALL OTHER REVIEW OF SYSTEMS ARE NEGATIVE.    VITAL SIGNS:  T(C): 36.9 (12-23-23 @ 11:05), Max: 37.5 (12-22-23 @ 16:05)  T(F): 98.4 (12-23-23 @ 11:05), Max: 99.5 (12-22-23 @ 16:05)  HR: 117 (12-23-23 @ 14:00) (48 - 145)  BP: 118/83 (12-23-23 @ 14:00) (90/62 - 123/91)  RR: 25 (12-23-23 @ 14:00) (16 - 25)  SpO2: 100% (12-23-23 @ 13:00) (93% - 100%)    INTAKE AND OUTPUT:     12-22 @ 07:01  -  12-23 @ 07:00  --------------------------------------------------------  IN: 228 mL / OUT: 850 mL / NET: -622 mL        PHYSICAL EXAM:  Constitutional: Comfortable . No acute distress.   HEENT: Atraumatic and normocephalic , neck is supple . no JVD. No carotid bruit.  CNS: A&Ox3. No focal deficits.   Respiratory: CTAB, unlabored   Cardiovascular: RRR normal s1 s2. No murmur. No rubs or gallop.  Gastrointestinal: Soft, non-tender. +Bowel sounds.   Extremities: 2+ Peripheral Pulses, No clubbing, cyanosis, or edema  Psychiatric: Calm . no agitation.   Skin: Warm and dry, no ulcers on extremities     LABS:  ( 20 Dec 2023 18:12 )  Troponin T  X    ,  CPK  173<H>, CKMB  X    , BNP X                                  11.3   5.74  )-----------( 107      ( 23 Dec 2023 02:43 )             32.4     12-23    136  |  102  |  15.6  ----------------------------<  113<H>  4.2   |  26.0  |  0.84    Ca    8.0<L>      23 Dec 2023 02:43  Phos  2.7     12-23  Mg     2.0     12-23    TPro  5.7<L>  /  Alb  2.8<L>  /  TBili  1.2  /  DBili  x   /  AST  98<H>  /  ALT  42<H>  /  AlkPhos  220<H>  12-22      Urinalysis Basic - ( 23 Dec 2023 02:43 )    Color: x / Appearance: x / SG: x / pH: x  Gluc: 113 mg/dL / Ketone: x  / Bili: x / Urobili: x   Blood: x / Protein: x / Nitrite: x   Leuk Esterase: x / RBC: x / WBC x   Sq Epi: x / Non Sq Epi: x / Bacteria: x      12-23-23 @ 02:43  CHolesterol: --,  HDL: --,  LDL: --, Triglycerides: 1045   12-22-23 @ 22:42  CHolesterol: --,  HDL: --,  LDL: --, Triglycerides: 1194           INTERPRETATION OF TELEMETRY: SR 1st, Josue I    ECG: initial EKG non acute, following EKG with 1mm HARMONY in inferior and lateral  Prior ECG: Yes [  ] No [x  ]                                                    [facility]                                              CARDIOLOGY AT [place] [address]                                             Telephone: [phone]. 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 Presents with withdrawal and witnessed seizure. Course complicated by elevated triglycerides. Cardiology called regard ?HFpEF. Noted to have mobitz 1 and SR 1st degree on tele. Reports having normal NST in [place] last year. Denies CP, weight gain, syncope, dizziness          CARDIAC TESTING   ECHO:  < from: TTE Limited W or WO Ultrasound Enhancing Agent (12.21.23 @ 18:39) >   1. Left ventricular systolic function is hyperdynamic with an ejection fraction of 76 % by Ling's method of disks.   2. Normal right ventricular cavity size and systolic function.   3. No prior echocardiogram is available for comparison.    < end of copied text >    STRESS:    CATH:     ELECTROPHYSIOLOGY:     PAST MEDICAL HISTORY  Type 2 diabetes mellitus without complication, with long-term current use of insulin    Alcohol abuse        PAST SURGICAL HISTORY      SOCIAL HISTORY:  Denies smoking/drugs  CIGARETTES:     ALCOHOL:  DRUGS:    FAMILY HISTORY:  No pertinent family history in first degree relatives      Family History of Cardiovascular Disease:  Yes [  ] No [ x ]  Coronary Artery Disease in first degree relative: Yes [  ] No [x  ]  Sudden Cardiac Death in First degree relative: Yes [  ] No [  x]    HOME MEDICATIONS:  gabapentin 100 mg oral tablet: orally 3 times a day (21 Dec 2023 04:11)  insulin glargine (concentrated) 300 units/mL subcutaneous solution: 16 unit(s) subcutaneous once a day (21 Dec 2023 04:11)      CURRENT CARDIAC MEDICATIONS:      CURRENT OTHER MEDICATIONS:  acetaminophen     Tablet .. 650 milliGRAM(s) Oral every 6 hours PRN Temp greater or equal to 38C (100.4F), Mild Pain (1 - 3)  gabapentin 100 milliGRAM(s) Oral three times a day  LORazepam     Tablet   Oral   LORazepam     Tablet 1 milliGRAM(s) Oral every 4 hours, Stop order after: 6 Doses  LORazepam   Injectable 2 milliGRAM(s) IV Push every 1 hour PRN Symptom-triggered: each CIWA -Ar score 8 or GREATER  melatonin 3 milliGRAM(s) Oral at bedtime PRN Insomnia  ondansetron Injectable 4 milliGRAM(s) IV Push every 8 hours PRN Nausea and/or Vomiting  aluminum hydroxide/magnesium hydroxide/simethicone Suspension 30 milliLiter(s) Oral every 4 hours PRN Dyspepsia  pantoprazole  Injectable 40 milliGRAM(s) IV Push two times a day  atorvastatin 20 milliGRAM(s) Oral at bedtime  chlorhexidine 2% Cloths 1 Application(s) Topical <User Schedule>  dextrose 50% Injectable 25 Gram(s) IV Push once, Stop order after: 1 Doses  dextrose 50% Injectable 25 Gram(s) IV Push once, Stop order after: 1 Doses  dextrose 50% Injectable 12.5 Gram(s) IV Push once, Stop order after: 1 Doses  dextrose Oral Gel 15 Gram(s) Oral once, Stop order after: 1 Doses PRN Blood Glucose LESS THAN 70 milliGRAM(s)/deciliter  fenofibrate Tablet 145 milliGRAM(s) Oral daily  folic acid 1 milliGRAM(s) Oral daily  glucagon  Injectable 1 milliGRAM(s) IntraMuscular once, Stop order after: 1 Doses  influenza   Vaccine 0.5 milliLiter(s) IntraMuscular once  insulin glargine Injectable (LANTUS) 16 Unit(s) SubCutaneous every morning  insulin lispro (ADMELOG) corrective regimen sliding scale   SubCutaneous three times a day before meals  insulin lispro (ADMELOG) corrective regimen sliding scale   SubCutaneous at bedtime  insulin lispro Injectable (ADMELOG) 7 Unit(s) SubCutaneous three times a day before meals  multivitamin 1 Tablet(s) Oral daily  omega-3-Acid Ethyl Esters 2 Gram(s) Oral two times a day  thiamine 100 milliGRAM(s) Oral daily      ALLERGIES:   No Known Allergies      REVIEW OF SYMPTOMS:   CONSTITUTIONAL: No fever, no chills, no weight loss, no weight gain, no fatigue   ENMT:  No vertigo; No sinus or throat pain  NECK: No pain or stiffness  CARDIOVASCULAR: No chest pain, no dyspnea, no syncope/presyncope, no palpitations, no dizziness, no Orthopnea, no Paroxsymal nocturnal dyspnea  RESPIRATORY: no Shortness of breath, no cough, no wheezing  : No dysuria, no hematuria   GI: No dark color stool, no nausea, no diarrhea, no constipation, no abdominal pain   NEURO: No headache, no slurred speech   MUSCULOSKELETAL: No joint pain or swelling; No muscle, back, or extremity pain  PSYCH: No agitation, no anxiety.    ALL OTHER REVIEW OF SYSTEMS ARE NEGATIVE.    VITAL SIGNS:  T(C): 36.9 (12-23-23 @ 11:05), Max: 37.5 (12-22-23 @ 16:05)  T(F): 98.4 (12-23-23 @ 11:05), Max: 99.5 (12-22-23 @ 16:05)  HR: 117 (12-23-23 @ 14:00) (48 - 145)  BP: 118/83 (12-23-23 @ 14:00) (90/62 - 123/91)  RR: 25 (12-23-23 @ 14:00) (16 - 25)  SpO2: 100% (12-23-23 @ 13:00) (93% - 100%)    INTAKE AND OUTPUT:     12-22 @ 07:01  -  12-23 @ 07:00  --------------------------------------------------------  IN: 228 mL / OUT: 850 mL / NET: -622 mL        PHYSICAL EXAM:  Constitutional: Comfortable . No acute distress.   HEENT: Atraumatic and normocephalic , neck is supple . no JVD. No carotid bruit.  CNS: A&Ox3. No focal deficits.   Respiratory: CTAB, unlabored   Cardiovascular: RRR normal s1 s2. No murmur. No rubs or gallop.  Gastrointestinal: Soft, non-tender. +Bowel sounds.   Extremities: 2+ Peripheral Pulses, No clubbing, cyanosis, or edema  Psychiatric: Calm . no agitation.   Skin: Warm and dry, no ulcers on extremities     LABS:  ( 20 Dec 2023 18:12 )  Troponin T  X    ,  CPK  173<H>, CKMB  X    , BNP X                                  11.3   5.74  )-----------( 107      ( 23 Dec 2023 02:43 )             32.4     12-23    136  |  102  |  15.6  ----------------------------<  113<H>  4.2   |  26.0  |  0.84    Ca    8.0<L>      23 Dec 2023 02:43  Phos  2.7     12-23  Mg     2.0     12-23    TPro  5.7<L>  /  Alb  2.8<L>  /  TBili  1.2  /  DBili  x   /  AST  98<H>  /  ALT  42<H>  /  AlkPhos  220<H>  12-22      Urinalysis Basic - ( 23 Dec 2023 02:43 )    Color: x / Appearance: x / SG: x / pH: x  Gluc: 113 mg/dL / Ketone: x  / Bili: x / Urobili: x   Blood: x / Protein: x / Nitrite: x   Leuk Esterase: x / RBC: x / WBC x   Sq Epi: x / Non Sq Epi: x / Bacteria: x      12-23-23 @ 02:43  CHolesterol: --,  HDL: --,  LDL: --, Triglycerides: 1045   12-22-23 @ 22:42  CHolesterol: --,  HDL: --,  LDL: --, Triglycerides: 1194           INTERPRETATION OF TELEMETRY: SR 1st, Josue I    ECG: initial EKG non acute, following EKG with 1mm HARMONY in inferior and lateral  Prior ECG: Yes [  ] No [x  ]

## 2023-12-23 NOTE — CONSULT NOTE ADULT - NS ATTEND AMEND GEN_ALL_CORE FT
seen with above,    28F history significant for DM1 (A1c 8.3), congenital heart blocks?, chronic alcohol abuse (drinks Vodka 2 pints daily) with alcohol withdrawal seizure, resides at [place] visiting family in [place], presents with witnessed seizure found with hyponatremia 118 and TG >4K with TC >500s admitted to MICU on insulin gtt and started on fenofibrate/Lovaza for severe hypertriglyceridemia, EKG with 1st degree AV block (250-300 ms) and Tele with sinus tach but also episodes of Mobitz 1 & 2 heart blocks around 5am  -she denies prior syncope, has been following with a cardiologist in [place] with Holter done in the past  -Echo done here with preserved LV EF, euvolemic on exam, there is no evidence of HF, check baseline pBNP  -check direct LDL level, started on statin in the MICU but given reproductive age need to stop statin if contemplating pregnancy  -she denies any family history cardiac disease or familial hyperlipidemia, attempt to contact her father's phone for collateral not able to be reached   -continue telemetry while inpatient, follow up outpatient with her cardiologist for long term rhythm monitoring  -strongly advised alcohol cessation, detox/rehab program  -reconsult if new cardiac issue arise          Marcial Hines DO, [facility]  Faculty Non-Invasive Cardiologist  [phone] seen with above,    28F history significant for DM1 (A1c 8.3), congenital heart blocks?, chronic alcohol abuse (drinks Vodka 2 pints daily) with alcohol withdrawal seizure, resides at [place] visiting family in [place], presents with witnessed seizure found with hyponatremia 118 and TG >4K with TC >500s admitted to MICU on insulin gtt and started on fenofibrate/Lovaza for severe hypertriglyceridemia, EKG with 1st degree AV block (250-300 ms) and Tele with sinus tach but also episodes of Mobitz 1 & 2 heart blocks around 5am  -she denies prior syncope, has been following with a cardiologist in [place] with Holter done in the past  -Echo done here with preserved LV EF, euvolemic on exam, there is no evidence of HF, check baseline pBNP  -check direct LDL level, started on statin in the MICU but given reproductive age need to stop statin if contemplating pregnancy  -she denies any family history cardiac disease or familial hyperlipidemia, attempt to contact her father's phone for collateral not able to be reached   -continue telemetry while inpatient, follow up outpatient with her cardiologist for long term rhythm monitoring  -strongly advised alcohol cessation, detox/rehab program  -reconsult if new cardiac issue arise          Marcial Hines DO, [facility]  Faculty Non-Invasive Cardiologist  [phone]

## 2023-12-23 NOTE — PROGRESS NOTE ADULT - ASSESSMENT
29 y/o F from [place] w/ PMH of ETOH abuse (hx of withdrawal seizrues in the past but no DTs), IDDM (has reji 3 and uses basal/bolus pen regimen), congenital heart block was BIBA after having a witnessed seizure.  Pt reports at baseline she drinks 2pints of vodka daily but tried cutting a few days prior to this event.  On arrival pt was found to have Na 118 and TG of >4000.  Endocrineology was consulted and pt was placed on insulin gtt and admitted to MICU.  Pt had no abd pain and lipase was 20.  w/ the insulin gtt TG have steadily improved and now are 1045.  Endo transitioned pt to basal/bolus regimen and started pt on fenofibrate and lovaza.  Pt is on an ativan taper for ETOH withdrawal.  Pt is medically stable for down grade to medicine.        EtOH withdrawal seizure   - Hx of withdrawal seizures in the past   - c/w CIWA protocol   - c/w Ativan taper and prn ativan per protocol   - c/w MVI, folate and thiamine   - Utox not performed   - Tylenol, ASA and EtOH blood levels negative   - Counseled on abstaining from etoh use       Hypertriglyceridemia   - TG>4K on admission   - Pt had no signs or symptoms of pancreatitis and lipase normal   - Was placed on insulin gtt and TG have trended down to 1045  - Pt switched back to basal/bolus insulin regimen and on fenofibrate and lovaza  - Will repeat TG in AM   - Endocrinology following and recs noted       Mild Transaminitis   - Likely from ETOH abuse   - Pt is asymptomatic w/ benign abdominal exam   - RUQ US pending   - Tbili normal   - Trend LFTs and avoid hepatotoxic meds if possible      New HFpEF  - TTE reporting hyperdynamic LV w/ EF 76%  - Suspect ETOH is contributing but typically see rEF w/ prolonged hx of ETOH   - No reported hx of HF  - Clinically euvolemic at this time   - Will check utox and thyroid   - Monitor daily weights and I/O's   - Pt is from [place] and will need repeat TTE in as outpt in 3 months to reassess  - Will consult cardiology for further recs while inpt      IDDM w/ hyperglycemia   - a1c 8.4  - Was on insulin gtt for hypertriglyceridemia   - Now back to basal/bolus insulin regimen and BG remains above goal   - Noted to have junk food bedside and counseled on diet modifications   - Titrate insulin regimen to achieve BG <180  - Endocrine following and recs noted       Hyponatremic hypochloremia   - Na 118 on admission and overcorrected in first 24hrs   - Na/Cl now normal   - Monitor lytes       Normocytic anemia / Thrombocytopenia   - H/H stable overall   - Plts continue trending down slowly (160-->107) but now signs of bleeding and hemodynamically stable   - Chronic ETOH use could contribute to low plts but was normal on admission   - Will order CBC w/ diff and smear for AM       VTE ppx: ambulatory  27 y/o F from [place] w/ PMH of ETOH abuse (hx of withdrawal seizrues in the past but no DTs), IDDM (has reji 3 and uses basal/bolus pen regimen), congenital heart block was BIBA after having a witnessed seizure.  Pt reports at baseline she drinks 2pints of vodka daily but tried cutting a few days prior to this event.  On arrival pt was found to have Na 118 and TG of >4000.  Endocrineology was consulted and pt was placed on insulin gtt and admitted to MICU.  Pt had no abd pain and lipase was 20.  w/ the insulin gtt TG have steadily improved and now are 1045.  Endo transitioned pt to basal/bolus regimen and started pt on fenofibrate and lovaza.  Pt is on an ativan taper for ETOH withdrawal.  Pt is medically stable for down grade to medicine.        EtOH withdrawal seizure   - Hx of withdrawal seizures in the past   - c/w CIWA protocol   - c/w Ativan taper and prn ativan per protocol   - c/w MVI, folate and thiamine   - Utox not performed   - Tylenol, ASA and EtOH blood levels negative   - Counseled on abstaining from etoh use       Hypertriglyceridemia   - TG>4K on admission   - Pt had no signs or symptoms of pancreatitis and lipase normal   - Was placed on insulin gtt and TG have trended down to 1045  - Pt switched back to basal/bolus insulin regimen and on fenofibrate and lovaza  - Will repeat TG in AM   - Endocrinology following and recs noted       Mild Transaminitis   - Likely from ETOH abuse   - Pt is asymptomatic w/ benign abdominal exam   - RUQ US pending   - Tbili normal   - Trend LFTs and avoid hepatotoxic meds if possible      New HFpEF  - TTE reporting hyperdynamic LV w/ EF 76%  - Suspect ETOH is contributing but typically see rEF w/ prolonged hx of ETOH   - No reported hx of HF  - Clinically euvolemic at this time   - Will check utox and thyroid   - Monitor daily weights and I/O's   - Pt is from [place] and will need repeat TTE in as outpt in 3 months to reassess  - Will consult cardiology for further recs while inpt      IDDM w/ hyperglycemia   - a1c 8.4  - Was on insulin gtt for hypertriglyceridemia   - Now back to basal/bolus insulin regimen and BG remains above goal   - Noted to have junk food bedside and counseled on diet modifications   - Titrate insulin regimen to achieve BG <180  - Endocrine following and recs noted       Hyponatremic hypochloremia   - Na 118 on admission and overcorrected in first 24hrs   - Na/Cl now normal   - Monitor lytes       Normocytic anemia / Thrombocytopenia   - H/H stable overall   - Plts continue trending down slowly (160-->107) but now signs of bleeding and hemodynamically stable   - Chronic ETOH use could contribute to low plts but was normal on admission   - Will order CBC w/ diff and smear for AM       VTE ppx: ambulatory  29 y/o F from [place] w/ PMH of ETOH abuse (hx of withdrawal seizrues in the past but no DTs), IDDM (has reji 3 and uses basal/bolus pen regimen), congenital heart block was BIBA after having a witnessed seizure.  Pt reports at baseline she drinks 2pints of vodka daily but tried cutting a few days prior to this event.  On arrival pt was found to have Na 118 and TG of >4000.   Na was pseudohyponatremia due to hypertriglyceridemia.  Endocrineology was consulted and pt was placed on insulin gtt and admitted to MICU.  Pt had no abd pain and lipase was 20.  w/ the insulin gtt TG have steadily improved and now are 1045.  Endo transitioned pt to basal/bolus regimen and started pt on fenofibrate and lovaza.  Pt is on an ativan taper for ETOH withdrawal.  Pt is medically stable for down grade to medicine.        EtOH withdrawal seizure   - Hx of withdrawal seizures in the past   - c/w CIWA protocol   - c/w Ativan taper and prn ativan per protocol   - c/w MVI, folate and thiamine   - Utox not performed   - Tylenol, ASA and EtOH blood levels negative   - Counseled on abstaining from etoh use       Hypertriglyceridemia   - TG>4K on admission   - Pt had no signs or symptoms of pancreatitis and lipase normal   - Was placed on insulin gtt and TG have trended down to 1045   - Pt switched back to basal/bolus insulin regimen and on fenofibrate and lovaza  - Will repeat TG in AM   - Endocrinology following and recs noted       Mild Transaminitis   - Likely from ETOH abuse   - Pt is asymptomatic w/ benign abdominal exam   - RUQ US pending   - Tbili normal   - Trend LFTs and avoid hepatotoxic meds if possible      New HFpEF  - TTE reporting hyperdynamic LV w/ EF 76%  - Suspect ETOH is contributing but typically see rEF w/ prolonged hx of ETOH   - No reported hx of HF  - Clinically euvolemic at this time   - Will check utox and thyroid   - Monitor daily weights and I/O's   - Pt is from [place] and will need repeat TTE in as outpt in 3 months to reassess  - Will consult cardiology for further recs while inpt      IDDM w/ hyperglycemia   - a1c 8.4  - Was on insulin gtt for hypertriglyceridemia   - Now back to basal/bolus insulin regimen and BG remains above goal   - Noted to have junk food bedside and counseled on diet modifications   - Titrate insulin regimen to achieve BG <180  - Endocrine following and recs noted       Hyponatremic hypochloremia   - Pseudohyponatremia in the setting of hypertriglyceridemia  - Na/Cl now normal   - Monitor lytes       Normocytic anemia / Thrombocytopenia   - H/H stable overall   - Plts continue trending down slowly (160-->107) but now signs of bleeding and hemodynamically stable   - Chronic ETOH use could contribute to low plts but was normal on admission   - Will order CBC w/ diff and smear for AM       VTE ppx: ambulatory

## 2023-12-23 NOTE — CONSULT NOTE ADULT - ASSESSMENT
28y old  Female PMH EtOH abuse, IDDM, congenital heart block 2nd and 3rd degree per patient report. Presents with withdrawal and witnessed seizure. Course complicated by elevated triglycerides. Cardiology called regard ?HFpEF. Noted to have mobitz 1 and SR 1st degree on tele. Reports having normal NST in [place] last year.

## 2023-12-24 ENCOUNTER — TRANSCRIPTION ENCOUNTER (OUTPATIENT)
Age: 28
End: 2023-12-24

## 2023-12-24 VITALS
DIASTOLIC BLOOD PRESSURE: 77 MMHG | SYSTOLIC BLOOD PRESSURE: 114 MMHG | RESPIRATION RATE: 31 BRPM | HEART RATE: 131 BPM | OXYGEN SATURATION: 97 %

## 2023-12-24 LAB
ALBUMIN SERPL ELPH-MCNC: 2.6 G/DL — LOW (ref 3.3–5.2)
ALBUMIN SERPL ELPH-MCNC: 2.6 G/DL — LOW (ref 3.3–5.2)
ALP SERPL-CCNC: 233 U/L — HIGH (ref 40–120)
ALP SERPL-CCNC: 233 U/L — HIGH (ref 40–120)
ALT FLD-CCNC: 30 U/L — SIGNIFICANT CHANGE UP
ALT FLD-CCNC: 30 U/L — SIGNIFICANT CHANGE UP
ANION GAP SERPL CALC-SCNC: 9 MMOL/L — SIGNIFICANT CHANGE UP (ref 5–17)
ANION GAP SERPL CALC-SCNC: 9 MMOL/L — SIGNIFICANT CHANGE UP (ref 5–17)
AST SERPL-CCNC: 46 U/L — HIGH
AST SERPL-CCNC: 46 U/L — HIGH
BASOPHILS # BLD AUTO: 0.07 K/UL — SIGNIFICANT CHANGE UP (ref 0–0.2)
BASOPHILS # BLD AUTO: 0.07 K/UL — SIGNIFICANT CHANGE UP (ref 0–0.2)
BASOPHILS NFR BLD AUTO: 1.2 % — SIGNIFICANT CHANGE UP (ref 0–2)
BASOPHILS NFR BLD AUTO: 1.2 % — SIGNIFICANT CHANGE UP (ref 0–2)
BILIRUB DIRECT SERPL-MCNC: 0.6 MG/DL — HIGH (ref 0–0.3)
BILIRUB DIRECT SERPL-MCNC: 0.6 MG/DL — HIGH (ref 0–0.3)
BILIRUB INDIRECT FLD-MCNC: 0.4 MG/DL — SIGNIFICANT CHANGE UP (ref 0.2–1)
BILIRUB INDIRECT FLD-MCNC: 0.4 MG/DL — SIGNIFICANT CHANGE UP (ref 0.2–1)
BILIRUB SERPL-MCNC: 1 MG/DL — SIGNIFICANT CHANGE UP (ref 0.4–2)
BILIRUB SERPL-MCNC: 1 MG/DL — SIGNIFICANT CHANGE UP (ref 0.4–2)
BUN SERPL-MCNC: 17.3 MG/DL — SIGNIFICANT CHANGE UP (ref 8–20)
BUN SERPL-MCNC: 17.3 MG/DL — SIGNIFICANT CHANGE UP (ref 8–20)
CALCIUM SERPL-MCNC: 8.2 MG/DL — LOW (ref 8.4–10.5)
CALCIUM SERPL-MCNC: 8.2 MG/DL — LOW (ref 8.4–10.5)
CHLORIDE SERPL-SCNC: 94 MMOL/L — LOW (ref 96–108)
CHLORIDE SERPL-SCNC: 94 MMOL/L — LOW (ref 96–108)
CO2 SERPL-SCNC: 28 MMOL/L — SIGNIFICANT CHANGE UP (ref 22–29)
CO2 SERPL-SCNC: 28 MMOL/L — SIGNIFICANT CHANGE UP (ref 22–29)
CREAT SERPL-MCNC: 1.22 MG/DL — SIGNIFICANT CHANGE UP (ref 0.5–1.3)
CREAT SERPL-MCNC: 1.22 MG/DL — SIGNIFICANT CHANGE UP (ref 0.5–1.3)
EGFR: 62 ML/MIN/1.73M2 — SIGNIFICANT CHANGE UP
EGFR: 62 ML/MIN/1.73M2 — SIGNIFICANT CHANGE UP
EOSINOPHIL # BLD AUTO: 0.06 K/UL — SIGNIFICANT CHANGE UP (ref 0–0.5)
EOSINOPHIL # BLD AUTO: 0.06 K/UL — SIGNIFICANT CHANGE UP (ref 0–0.5)
EOSINOPHIL NFR BLD AUTO: 1 % — SIGNIFICANT CHANGE UP (ref 0–6)
EOSINOPHIL NFR BLD AUTO: 1 % — SIGNIFICANT CHANGE UP (ref 0–6)
GLUCOSE BLDC GLUCOMTR-MCNC: 213 MG/DL — HIGH (ref 70–99)
GLUCOSE BLDC GLUCOMTR-MCNC: 213 MG/DL — HIGH (ref 70–99)
GLUCOSE BLDC GLUCOMTR-MCNC: 350 MG/DL — HIGH (ref 70–99)
GLUCOSE BLDC GLUCOMTR-MCNC: 350 MG/DL — HIGH (ref 70–99)
GLUCOSE BLDC GLUCOMTR-MCNC: 411 MG/DL — HIGH (ref 70–99)
GLUCOSE BLDC GLUCOMTR-MCNC: 411 MG/DL — HIGH (ref 70–99)
GLUCOSE SERPL-MCNC: 375 MG/DL — HIGH (ref 70–99)
GLUCOSE SERPL-MCNC: 375 MG/DL — HIGH (ref 70–99)
HCT VFR BLD CALC: 30.8 % — LOW (ref 34.5–45)
HCT VFR BLD CALC: 30.8 % — LOW (ref 34.5–45)
HGB BLD-MCNC: 10.4 G/DL — LOW (ref 11.5–15.5)
HGB BLD-MCNC: 10.4 G/DL — LOW (ref 11.5–15.5)
IMM GRANULOCYTES NFR BLD AUTO: 0.3 % — SIGNIFICANT CHANGE UP (ref 0–0.9)
IMM GRANULOCYTES NFR BLD AUTO: 0.3 % — SIGNIFICANT CHANGE UP (ref 0–0.9)
LDLC SERPL DIRECT ASSAY-MCNC: 93 MG/DL — SIGNIFICANT CHANGE UP
LDLC SERPL DIRECT ASSAY-MCNC: 93 MG/DL — SIGNIFICANT CHANGE UP
LYMPHOCYTES # BLD AUTO: 1.65 K/UL — SIGNIFICANT CHANGE UP (ref 1–3.3)
LYMPHOCYTES # BLD AUTO: 1.65 K/UL — SIGNIFICANT CHANGE UP (ref 1–3.3)
LYMPHOCYTES # BLD AUTO: 28.2 % — SIGNIFICANT CHANGE UP (ref 13–44)
LYMPHOCYTES # BLD AUTO: 28.2 % — SIGNIFICANT CHANGE UP (ref 13–44)
MAGNESIUM SERPL-MCNC: 1.9 MG/DL — SIGNIFICANT CHANGE UP (ref 1.6–2.6)
MAGNESIUM SERPL-MCNC: 1.9 MG/DL — SIGNIFICANT CHANGE UP (ref 1.6–2.6)
MCHC RBC-ENTMCNC: 32.7 PG — SIGNIFICANT CHANGE UP (ref 27–34)
MCHC RBC-ENTMCNC: 32.7 PG — SIGNIFICANT CHANGE UP (ref 27–34)
MCHC RBC-ENTMCNC: 33.8 GM/DL — SIGNIFICANT CHANGE UP (ref 32–36)
MCHC RBC-ENTMCNC: 33.8 GM/DL — SIGNIFICANT CHANGE UP (ref 32–36)
MCV RBC AUTO: 96.9 FL — SIGNIFICANT CHANGE UP (ref 80–100)
MCV RBC AUTO: 96.9 FL — SIGNIFICANT CHANGE UP (ref 80–100)
MONOCYTES # BLD AUTO: 0.27 K/UL — SIGNIFICANT CHANGE UP (ref 0–0.9)
MONOCYTES # BLD AUTO: 0.27 K/UL — SIGNIFICANT CHANGE UP (ref 0–0.9)
MONOCYTES NFR BLD AUTO: 4.6 % — SIGNIFICANT CHANGE UP (ref 2–14)
MONOCYTES NFR BLD AUTO: 4.6 % — SIGNIFICANT CHANGE UP (ref 2–14)
NEUTROPHILS # BLD AUTO: 3.78 K/UL — SIGNIFICANT CHANGE UP (ref 1.8–7.4)
NEUTROPHILS # BLD AUTO: 3.78 K/UL — SIGNIFICANT CHANGE UP (ref 1.8–7.4)
NEUTROPHILS NFR BLD AUTO: 64.7 % — SIGNIFICANT CHANGE UP (ref 43–77)
NEUTROPHILS NFR BLD AUTO: 64.7 % — SIGNIFICANT CHANGE UP (ref 43–77)
PHOSPHATE SERPL-MCNC: 2.4 MG/DL — SIGNIFICANT CHANGE UP (ref 2.4–4.7)
PHOSPHATE SERPL-MCNC: 2.4 MG/DL — SIGNIFICANT CHANGE UP (ref 2.4–4.7)
PLATELET # BLD AUTO: 101 K/UL — LOW (ref 150–400)
PLATELET # BLD AUTO: 101 K/UL — LOW (ref 150–400)
POTASSIUM SERPL-MCNC: 4.2 MMOL/L — SIGNIFICANT CHANGE UP (ref 3.5–5.3)
POTASSIUM SERPL-MCNC: 4.2 MMOL/L — SIGNIFICANT CHANGE UP (ref 3.5–5.3)
POTASSIUM SERPL-SCNC: 4.2 MMOL/L — SIGNIFICANT CHANGE UP (ref 3.5–5.3)
POTASSIUM SERPL-SCNC: 4.2 MMOL/L — SIGNIFICANT CHANGE UP (ref 3.5–5.3)
PROT SERPL-MCNC: 5.6 G/DL — LOW (ref 6.6–8.7)
PROT SERPL-MCNC: 5.6 G/DL — LOW (ref 6.6–8.7)
RBC # BLD: 3.18 M/UL — LOW (ref 3.8–5.2)
RBC # BLD: 3.18 M/UL — LOW (ref 3.8–5.2)
RBC # FLD: 13.1 % — SIGNIFICANT CHANGE UP (ref 10.3–14.5)
RBC # FLD: 13.1 % — SIGNIFICANT CHANGE UP (ref 10.3–14.5)
SODIUM SERPL-SCNC: 131 MMOL/L — LOW (ref 135–145)
SODIUM SERPL-SCNC: 131 MMOL/L — LOW (ref 135–145)
TRIGL SERPL-MCNC: 575 MG/DL — HIGH
TRIGL SERPL-MCNC: 575 MG/DL — HIGH
TSH SERPL-MCNC: 3.76 UIU/ML — SIGNIFICANT CHANGE UP (ref 0.27–4.2)
TSH SERPL-MCNC: 3.76 UIU/ML — SIGNIFICANT CHANGE UP (ref 0.27–4.2)
WBC # BLD: 5.85 K/UL — SIGNIFICANT CHANGE UP (ref 3.8–10.5)
WBC # BLD: 5.85 K/UL — SIGNIFICANT CHANGE UP (ref 3.8–10.5)
WBC # FLD AUTO: 5.85 K/UL — SIGNIFICANT CHANGE UP (ref 3.8–10.5)
WBC # FLD AUTO: 5.85 K/UL — SIGNIFICANT CHANGE UP (ref 3.8–10.5)

## 2023-12-24 PROCEDURE — 83930 ASSAY OF BLOOD OSMOLALITY: CPT

## 2023-12-24 PROCEDURE — 82435 ASSAY OF BLOOD CHLORIDE: CPT

## 2023-12-24 PROCEDURE — 84132 ASSAY OF SERUM POTASSIUM: CPT

## 2023-12-24 PROCEDURE — 96374 THER/PROPH/DIAG INJ IV PUSH: CPT

## 2023-12-24 PROCEDURE — 84295 ASSAY OF SERUM SODIUM: CPT

## 2023-12-24 PROCEDURE — 80048 BASIC METABOLIC PNL TOTAL CA: CPT

## 2023-12-24 PROCEDURE — 82553 CREATINE MB FRACTION: CPT

## 2023-12-24 PROCEDURE — 96375 TX/PRO/DX INJ NEW DRUG ADDON: CPT

## 2023-12-24 PROCEDURE — 99239 HOSP IP/OBS DSCHRG MGMT >30: CPT

## 2023-12-24 PROCEDURE — 83735 ASSAY OF MAGNESIUM: CPT

## 2023-12-24 PROCEDURE — 80061 LIPID PANEL: CPT

## 2023-12-24 PROCEDURE — 84484 ASSAY OF TROPONIN QUANT: CPT

## 2023-12-24 PROCEDURE — 80307 DRUG TEST PRSMV CHEM ANLYZR: CPT

## 2023-12-24 PROCEDURE — 85027 COMPLETE CBC AUTOMATED: CPT

## 2023-12-24 PROCEDURE — 84702 CHORIONIC GONADOTROPIN TEST: CPT

## 2023-12-24 PROCEDURE — 83935 ASSAY OF URINE OSMOLALITY: CPT

## 2023-12-24 PROCEDURE — 84100 ASSAY OF PHOSPHORUS: CPT

## 2023-12-24 PROCEDURE — 87641 MR-STAPH DNA AMP PROBE: CPT

## 2023-12-24 PROCEDURE — 82550 ASSAY OF CK (CPK): CPT

## 2023-12-24 PROCEDURE — 93308 TTE F-UP OR LMTD: CPT

## 2023-12-24 PROCEDURE — 80053 COMPREHEN METABOLIC PANEL: CPT

## 2023-12-24 PROCEDURE — 83036 HEMOGLOBIN GLYCOSYLATED A1C: CPT

## 2023-12-24 PROCEDURE — 85014 HEMATOCRIT: CPT

## 2023-12-24 PROCEDURE — 99285 EMERGENCY DEPT VISIT HI MDM: CPT | Mod: 25

## 2023-12-24 PROCEDURE — 93005 ELECTROCARDIOGRAM TRACING: CPT

## 2023-12-24 PROCEDURE — 36415 COLL VENOUS BLD VENIPUNCTURE: CPT

## 2023-12-24 PROCEDURE — 84300 ASSAY OF URINE SODIUM: CPT

## 2023-12-24 PROCEDURE — 99232 SBSQ HOSP IP/OBS MODERATE 35: CPT

## 2023-12-24 PROCEDURE — 82330 ASSAY OF CALCIUM: CPT

## 2023-12-24 PROCEDURE — 82803 BLOOD GASES ANY COMBINATION: CPT

## 2023-12-24 PROCEDURE — 93321 DOPPLER ECHO F-UP/LMTD STD: CPT

## 2023-12-24 PROCEDURE — 83690 ASSAY OF LIPASE: CPT

## 2023-12-24 PROCEDURE — 71045 X-RAY EXAM CHEST 1 VIEW: CPT

## 2023-12-24 PROCEDURE — 87640 STAPH A DNA AMP PROBE: CPT

## 2023-12-24 PROCEDURE — 84443 ASSAY THYROID STIM HORMONE: CPT

## 2023-12-24 PROCEDURE — 85025 COMPLETE CBC W/AUTO DIFF WBC: CPT

## 2023-12-24 PROCEDURE — 82947 ASSAY GLUCOSE BLOOD QUANT: CPT

## 2023-12-24 PROCEDURE — 82962 GLUCOSE BLOOD TEST: CPT

## 2023-12-24 PROCEDURE — 83721 ASSAY OF BLOOD LIPOPROTEIN: CPT

## 2023-12-24 PROCEDURE — 84478 ASSAY OF TRIGLYCERIDES: CPT

## 2023-12-24 PROCEDURE — 82248 BILIRUBIN DIRECT: CPT

## 2023-12-24 PROCEDURE — 85018 HEMOGLOBIN: CPT

## 2023-12-24 PROCEDURE — 83605 ASSAY OF LACTIC ACID: CPT

## 2023-12-24 RX ORDER — ATORVASTATIN CALCIUM 80 MG/1
1 TABLET, FILM COATED ORAL
Qty: 30 | Refills: 0
Start: 2023-12-24 | End: 2024-01-22

## 2023-12-24 RX ORDER — OMEGA-3 ACID ETHYL ESTERS 1 G
2 CAPSULE ORAL
Qty: 120 | Refills: 0
Start: 2023-12-24 | End: 2024-01-22

## 2023-12-24 RX ORDER — PANTOPRAZOLE SODIUM 20 MG/1
1 TABLET, DELAYED RELEASE ORAL
Qty: 30 | Refills: 0
Start: 2023-12-24 | End: 2024-01-22

## 2023-12-24 RX ORDER — ACETAMINOPHEN 500 MG
1000 TABLET ORAL ONCE
Refills: 0 | Status: DISCONTINUED | OUTPATIENT
Start: 2023-12-24 | End: 2023-12-24

## 2023-12-24 RX ORDER — FENOFIBRATE,MICRONIZED 130 MG
1 CAPSULE ORAL
Qty: 30 | Refills: 0
Start: 2023-12-24 | End: 2024-01-22

## 2023-12-24 RX ORDER — THIAMINE MONONITRATE (VIT B1) 100 MG
1 TABLET ORAL
Qty: 30 | Refills: 0
Start: 2023-12-24 | End: 2024-01-22

## 2023-12-24 RX ORDER — INSULIN HUMAN 100 [IU]/ML
5 INJECTION, SOLUTION SUBCUTANEOUS ONCE
Refills: 0 | Status: COMPLETED | OUTPATIENT
Start: 2023-12-24 | End: 2023-12-24

## 2023-12-24 RX ORDER — FOLIC ACID 0.8 MG
1 TABLET ORAL
Qty: 30 | Refills: 0
Start: 2023-12-24 | End: 2024-01-22

## 2023-12-24 RX ADMIN — GABAPENTIN 100 MILLIGRAM(S): 400 CAPSULE ORAL at 15:41

## 2023-12-24 RX ADMIN — Medication 650 MILLIGRAM(S): at 06:24

## 2023-12-24 RX ADMIN — Medication 1 TABLET(S): at 11:15

## 2023-12-24 RX ADMIN — Medication 145 MILLIGRAM(S): at 11:14

## 2023-12-24 RX ADMIN — Medication 0.5 MILLIGRAM(S): at 11:15

## 2023-12-24 RX ADMIN — Medication 4: at 13:00

## 2023-12-24 RX ADMIN — Medication 7 UNIT(S): at 13:01

## 2023-12-24 RX ADMIN — Medication 100 MILLIGRAM(S): at 11:15

## 2023-12-24 RX ADMIN — Medication 0.5 MILLIGRAM(S): at 15:41

## 2023-12-24 RX ADMIN — Medication 650 MILLIGRAM(S): at 05:24

## 2023-12-24 RX ADMIN — Medication 0.5 MILLIGRAM(S): at 02:22

## 2023-12-24 RX ADMIN — Medication 1 MILLIGRAM(S): at 11:14

## 2023-12-24 RX ADMIN — CHLORHEXIDINE GLUCONATE 1 APPLICATION(S): 213 SOLUTION TOPICAL at 05:25

## 2023-12-24 RX ADMIN — Medication 0.5 MILLIGRAM(S): at 05:25

## 2023-12-24 RX ADMIN — Medication 12: at 08:55

## 2023-12-24 RX ADMIN — GABAPENTIN 100 MILLIGRAM(S): 400 CAPSULE ORAL at 05:24

## 2023-12-24 RX ADMIN — INSULIN GLARGINE 16 UNIT(S): 100 INJECTION, SOLUTION SUBCUTANEOUS at 08:56

## 2023-12-24 RX ADMIN — Medication 2 GRAM(S): at 05:25

## 2023-12-24 RX ADMIN — INSULIN HUMAN 5 UNIT(S): 100 INJECTION, SOLUTION SUBCUTANEOUS at 09:08

## 2023-12-24 RX ADMIN — Medication 7 UNIT(S): at 08:56

## 2023-12-24 RX ADMIN — PANTOPRAZOLE SODIUM 40 MILLIGRAM(S): 20 TABLET, DELAYED RELEASE ORAL at 05:23

## 2023-12-24 NOTE — DISCHARGE NOTE PROVIDER - PROVIDER TOKENS
PROVIDER:[TOKEN:[[id number]]],PROVIDER:[TOKEN:[[id number]]],FREE:[LAST:[PMD],PHONE:[(   )    -],FAX:[(   )    -]] PROVIDER:[TOKEN:[[id number]]],PROVIDER:[TOKEN:[[id number]]],FREE:[LAST:[PMD],PHONE:[(   )    -],FAX:[(   )    -]]

## 2023-12-24 NOTE — DISCHARGE NOTE NURSING/CASE MANAGEMENT/SOCIAL WORK - PATIENT PORTAL LINK FT
You can access the FollowMyHealth Patient Portal offered by [facility] by registering at the following website: [email or web address]. By joining [employer]’s FollowMyHealth portal, you will also be able to view your health information using other applications (apps) compatible with our system. You can access the FollowMyHealth Patient Portal offered by [facility] by registering at the following website: [email or web address]. By joining [employer]’s FollowMyHealth portal, you will also be able to view your health information using other applications (apps) compatible with our system.

## 2023-12-24 NOTE — DISCHARGE NOTE PROVIDER - ATTENDING DISCHARGE PHYSICAL EXAMINATION:
Vital Signs Last 24 Hrs  T(C): 36.8 (24 Dec 2023 07:00), Max: 37.3 (23 Dec 2023 20:25)  T(F): 98.3 (24 Dec 2023 07:00), Max: 99.1 (23 Dec 2023 20:25)  HR: 129 (24 Dec 2023 14:00) (53 - 138)  BP: 104/76 (24 Dec 2023 14:00) (98/58 - 139/100)  BP(mean): 85 (24 Dec 2023 14:00) (69 - 110)  RR: 20 (24 Dec 2023 14:00) (13 - 30)  SpO2: 94% (24 Dec 2023 14:00) (94% - 100%)    Parameters below as of 24 Dec 2023 14:00  Patient On (Oxygen Delivery Method): room air    CONSTITUTIONAL: NAD  CARDIAC: regular rhythm, tachycardic  normal +S1, S2.   RESPIRATORY: Clear to auscultation bilaterally, no wheezes noted  GASTROINTESTINAL: Abdomen soft, non-tender, no guarding.  NEUROLOGICAL: Alert and oriented, no focal deficits, no motor or sensory deficits.  SKIN: warm, dry

## 2023-12-24 NOTE — DISCHARGE NOTE PROVIDER - NSDCMRMEDTOKEN_GEN_ALL_CORE_FT
atorvastatin 20 mg oral tablet: 1 tab(s) orally once a day (at bedtime)  fenofibrate 145 mg oral tablet: 1 tab(s) orally once a day  folic acid 1 mg oral tablet: 1 tab(s) orally once a day  gabapentin 100 mg oral tablet: orally 3 times a day  insulin glargine (concentrated) 300 units/mL subcutaneous solution: 16 unit(s) subcutaneous once a day  Multiple Vitamins oral tablet: 1 tab(s) orally once a day  omega-3 polyunsaturated fatty acids ethyl esters 1000 mg oral capsule: 2 cap(s) orally 2 times a day  Protonix 40 mg oral delayed release tablet: 1 tab(s) orally once a day  thiamine 100 mg oral tablet: 1 tab(s) orally once a day

## 2023-12-24 NOTE — PROVIDER CONTACT NOTE (OTHER) - SITUATION
Tele tech made RN aware that pt has new ST elevation on tele monitor
Pt's pre-breakfast blood glucose level is 411

## 2023-12-24 NOTE — DISCHARGE NOTE PROVIDER - NSDCCPCAREPLAN_GEN_ALL_CORE_FT
PRINCIPAL DISCHARGE DIAGNOSIS  Diagnosis: Alcohol dependence with withdrawal  Assessment and Plan of Treatment:       SECONDARY DISCHARGE DIAGNOSES  Diagnosis: History of heart block  Assessment and Plan of Treatment:     Diagnosis: Sinus tachycardia  Assessment and Plan of Treatment:     Diagnosis: Hypertriglyceridemia  Assessment and Plan of Treatment: s/p insulin drip. continue medications as prescribed. follow up with endocrinology    Diagnosis: Diabetes  Assessment and Plan of Treatment:

## 2023-12-24 NOTE — PROVIDER CONTACT NOTE (OTHER) - ACTION/TREATMENT ORDERED:
STAT EKG and trop ordered. Called lab and EKG to get tests done now
Give above as ordered with 5 units humulin and recheck blood glucose in 1 hour.

## 2023-12-24 NOTE — PROGRESS NOTE ADULT - SUBJECTIVE AND OBJECTIVE BOX
[place] CARDIOLOGY-[facility]                                                               Office: [address] [address]                                                              Telephone: [phone]. Fax:[phone]                                                                             PROGRESS NOTE  Reason for follow up: Heart block  Overnight: No new events.   Update: Denies palpitations or chest pain; sinus tach to 150s around 8:30 this morning, currently -130s, Tele also with 1st degree and Mobitz 1 at times.       Review of symptoms:   Cardiac:  No chest pain. No dyspnea. No palpitations.  Respiratory: No cough. No dyspnea  Gastrointestinal: No diarrhea. No abdominal pain. No bleeding.     Past medical history: No updates.   	  Vital Signs Last 24 Hrs  T(C): 36.8 (12-24-23 @ 07:00), Max: 37.3 (12-23-23 @ 20:25)  T(F): 98.3 (12-24-23 @ 07:00), Max: 99.1 (12-23-23 @ 20:25)  HR: 129 (12-24-23 @ 09:00) (53 - 129)  BP: 115/87 (12-24-23 @ 09:00) (98/58 - 139/100)  BP(mean): 97 (12-24-23 @ 09:00) (69 - 110)  RR: 14 (12-24-23 @ 09:00) (13 - 30)  SpO2: 100% (12-24-23 @ 09:00) (98% - 100%)  I&O's Summary    23 Dec 2023 07:01  -  24 Dec 2023 07:00  --------------------------------------------------------  IN: 0 mL / OUT: 250 mL / NET: -250 mL          PHYSICAL EXAM:  Appearance: Comfortable. No acute distress  HEENT:  Head and neck: Atraumatic. Normocephalic.  Normal oral mucosa, PERRL, Neck is supple. No JVD, No carotid bruit.   Neurologic: A&Ox 3, no focal deficits. EOMI, Cranial nerves are intact.  Lymphatic: No cervical lymphadenopathy  Cardiovascular: Normal S1 S2, No murmur, rubs/gallops. No JVD, No edema  Respiratory: Lungs clear to auscultation  Gastrointestinal:  Soft, Non-tender, + BS  Lower Extremities: No edema  Psychiatry: Patient is calm. No agitation. Mood & affect appropriate  Skin: No rashes/ecchymoses/cyanosis/ulcers visualized on the face, hands or feet.      CURRENT MEDICATIONS:  MEDICATIONS  (STANDING):  acetaminophen   IVPB .. 1000 milliGRAM(s) IV Intermittent once  atorvastatin 20 milliGRAM(s) Oral at bedtime  chlorhexidine 2% Cloths 1 Application(s) Topical <User Schedule>  dextrose 50% Injectable 25 Gram(s) IV Push once  dextrose 50% Injectable 12.5 Gram(s) IV Push once  dextrose 50% Injectable 25 Gram(s) IV Push once  fenofibrate Tablet 145 milliGRAM(s) Oral daily  folic acid 1 milliGRAM(s) Oral daily  gabapentin 100 milliGRAM(s) Oral three times a day  glucagon  Injectable 1 milliGRAM(s) IntraMuscular once  influenza   Vaccine 0.5 milliLiter(s) IntraMuscular once  insulin glargine Injectable (LANTUS) 16 Unit(s) SubCutaneous every morning  insulin lispro (ADMELOG) corrective regimen sliding scale   SubCutaneous three times a day before meals  insulin lispro (ADMELOG) corrective regimen sliding scale   SubCutaneous at bedtime  insulin lispro Injectable (ADMELOG) 7 Unit(s) SubCutaneous three times a day before meals  LORazepam     Tablet   Oral   LORazepam     Tablet 0.5 milliGRAM(s) Oral every 4 hours  multivitamin 1 Tablet(s) Oral daily  omega-3-Acid Ethyl Esters 2 Gram(s) Oral two times a day  pantoprazole  Injectable 40 milliGRAM(s) IV Push two times a day  thiamine 100 milliGRAM(s) Oral daily    MEDICATIONS  (PRN):  acetaminophen     Tablet .. 650 milliGRAM(s) Oral every 6 hours PRN Temp greater or equal to 38C (100.4F), Mild Pain (1 - 3)  aluminum hydroxide/magnesium hydroxide/simethicone Suspension 30 milliLiter(s) Oral every 4 hours PRN Dyspepsia  dextrose Oral Gel 15 Gram(s) Oral once PRN Blood Glucose LESS THAN 70 milliGRAM(s)/deciliter  LORazepam   Injectable 2 milliGRAM(s) IV Push every 1 hour PRN Symptom-triggered: each CIWA -Ar score 8 or GREATER  melatonin 3 milliGRAM(s) Oral at bedtime PRN Insomnia  ondansetron Injectable 4 milliGRAM(s) IV Push every 8 hours PRN Nausea and/or Vomiting      DIAGNOSTIC TESTING:  [ ] Echocardiogram:   < from: TTE Limited W or WO Ultrasound Enhancing Agent (12.21.23 @ 18:39) >     CONCLUSIONS:     1. Left ventricular systolic function is hyperdynamic with an ejection fraction of 76 % by Ling's method of disks.   2. Normal right ventricular cavity size and systolic function.   3. No prior echocardiogram is available for comparison.    < end of copied text >    [ ]  Catheterization:  [ ] Stress Test:      Labs:                        10.4   5.85  )-----------( 101      ( 24 Dec 2023 04:00 )             30.8     12-24    131<L>  |  94<L>  |  17.3  ----------------------------<  375<H>  4.2   |  28.0  |  1.22    Ca    8.2<L>      24 Dec 2023 04:00  Phos  2.4     12-24  Mg     1.9     12-24    TPro  5.6<L>  /  Alb  2.6<L>  /  TBili  1.0  /  DBili  0.6<H>  /  AST  46<H>  /  ALT  30  /  AlkPhos  233<H>  12-24     20 Dec 2023 18:12 Troponin x     / Creatine Kinase 173 U/L /  CKMB 2.7 ng/mL / CPK Mass Assay % x            Cholesterol --; Direct LDL --; HDL Cholesterol, Serum --; HDL/ Total Cholesterol Ratio Measurement --; Total Cholesterol/ HDL Ratio Measurement --; Triglycerides, Serum 575 mg/dL, Cholesterol --; Direct LDL --; HDL Cholesterol, Serum --; HDL/ Total Cholesterol Ratio Measurement --; Total Cholesterol/ HDL Ratio Measurement --; Triglycerides, Serum 1204 mg/dL, Cholesterol --; Direct LDL --; HDL Cholesterol, Serum --; HDL/ Total Cholesterol Ratio Measurement --; Total Cholesterol/ HDL Ratio Measurement --; Triglycerides, Serum 1045 mg/dL, Cholesterol --; Direct LDL --; HDL Cholesterol, Serum --; HDL/ Total Cholesterol Ratio Measurement --; Total Cholesterol/ HDL Ratio Measurement --; Triglycerides, Serum 1194 mg/dL, Cholesterol --; Direct LDL --; HDL Cholesterol, Serum --; HDL/ Total Cholesterol Ratio Measurement --; Total Cholesterol/ HDL Ratio Measurement --; Triglycerides, Serum 1549 mg/dL, Cholesterol --; Direct LDL --; HDL Cholesterol, Serum --; HDL/ Total Cholesterol Ratio Measurement --; Total Cholesterol/ HDL Ratio Measurement --; Triglycerides, Serum 2478 mg/dL, Cholesterol 587 mg/dL; Direct LDL --; HDL Cholesterol, Serum 4 mg/dL; HDL/ Total Cholesterol Ratio Measurement --; Total Cholesterol/ HDL Ratio Measurement --; Triglycerides, Serum >4425 mg/dL, Cholesterol 431 mg/dL; Direct LDL --; HDL Cholesterol, Serum 6 mg/dL; HDL/ Total Cholesterol Ratio Measurement --; Total Cholesterol/ HDL Ratio Measurement --; Triglycerides, Serum >4425 mg/dL  A1C with Estimated Average Glucose Result: 8.4 % (12-21-23 @ 08:00)      TELEMETRY: Sinus tach to 150s, 1st degree	                                                                     [place] CARDIOLOGY-[facility]                                                               Office: [address] [address]                                                              Telephone: [phone]. Fax:[phone]                                                                             PROGRESS NOTE  Reason for follow up: Heart block  Overnight: No new events.   Update: Denies palpitations or chest pain; sinus tach to 150s around 8:30 this morning, currently -130s, Tele also with 1st degree and Mobitz 1 at times.       Review of symptoms:   Cardiac:  No chest pain. No dyspnea. No palpitations.  Respiratory: No cough. No dyspnea  Gastrointestinal: No diarrhea. No abdominal pain. No bleeding.     Past medical history: No updates.   	  Vital Signs Last 24 Hrs  T(C): 36.8 (12-24-23 @ 07:00), Max: 37.3 (12-23-23 @ 20:25)  T(F): 98.3 (12-24-23 @ 07:00), Max: 99.1 (12-23-23 @ 20:25)  HR: 129 (12-24-23 @ 09:00) (53 - 129)  BP: 115/87 (12-24-23 @ 09:00) (98/58 - 139/100)  BP(mean): 97 (12-24-23 @ 09:00) (69 - 110)  RR: 14 (12-24-23 @ 09:00) (13 - 30)  SpO2: 100% (12-24-23 @ 09:00) (98% - 100%)  I&O's Summary    23 Dec 2023 07:01  -  24 Dec 2023 07:00  --------------------------------------------------------  IN: 0 mL / OUT: 250 mL / NET: -250 mL          PHYSICAL EXAM:  Appearance: Comfortable. No acute distress  HEENT:  Head and neck: Atraumatic. Normocephalic.  Normal oral mucosa, PERRL, Neck is supple. No JVD, No carotid bruit.   Neurologic: A&Ox 3, no focal deficits. EOMI, Cranial nerves are intact.  Lymphatic: No cervical lymphadenopathy  Cardiovascular: Normal S1 S2, No murmur, rubs/gallops. No JVD, No edema  Respiratory: Lungs clear to auscultation  Gastrointestinal:  Soft, Non-tender, + BS  Lower Extremities: No edema  Psychiatry: Patient is calm. No agitation. Mood & affect appropriate  Skin: No rashes/ecchymoses/cyanosis/ulcers visualized on the face, hands or feet.      CURRENT MEDICATIONS:  MEDICATIONS  (STANDING):  acetaminophen   IVPB .. 1000 milliGRAM(s) IV Intermittent once  atorvastatin 20 milliGRAM(s) Oral at bedtime  chlorhexidine 2% Cloths 1 Application(s) Topical <User Schedule>  dextrose 50% Injectable 25 Gram(s) IV Push once  dextrose 50% Injectable 12.5 Gram(s) IV Push once  dextrose 50% Injectable 25 Gram(s) IV Push once  fenofibrate Tablet 145 milliGRAM(s) Oral daily  folic acid 1 milliGRAM(s) Oral daily  gabapentin 100 milliGRAM(s) Oral three times a day  glucagon  Injectable 1 milliGRAM(s) IntraMuscular once  influenza   Vaccine 0.5 milliLiter(s) IntraMuscular once  insulin glargine Injectable (LANTUS) 16 Unit(s) SubCutaneous every morning  insulin lispro (ADMELOG) corrective regimen sliding scale   SubCutaneous three times a day before meals  insulin lispro (ADMELOG) corrective regimen sliding scale   SubCutaneous at bedtime  insulin lispro Injectable (ADMELOG) 7 Unit(s) SubCutaneous three times a day before meals  LORazepam     Tablet   Oral   LORazepam     Tablet 0.5 milliGRAM(s) Oral every 4 hours  multivitamin 1 Tablet(s) Oral daily  omega-3-Acid Ethyl Esters 2 Gram(s) Oral two times a day  pantoprazole  Injectable 40 milliGRAM(s) IV Push two times a day  thiamine 100 milliGRAM(s) Oral daily    MEDICATIONS  (PRN):  acetaminophen     Tablet .. 650 milliGRAM(s) Oral every 6 hours PRN Temp greater or equal to 38C (100.4F), Mild Pain (1 - 3)  aluminum hydroxide/magnesium hydroxide/simethicone Suspension 30 milliLiter(s) Oral every 4 hours PRN Dyspepsia  dextrose Oral Gel 15 Gram(s) Oral once PRN Blood Glucose LESS THAN 70 milliGRAM(s)/deciliter  LORazepam   Injectable 2 milliGRAM(s) IV Push every 1 hour PRN Symptom-triggered: each CIWA -Ar score 8 or GREATER  melatonin 3 milliGRAM(s) Oral at bedtime PRN Insomnia  ondansetron Injectable 4 milliGRAM(s) IV Push every 8 hours PRN Nausea and/or Vomiting      DIAGNOSTIC TESTING:  [ ] Echocardiogram:   < from: TTE Limited W or WO Ultrasound Enhancing Agent (12.21.23 @ 18:39) >     CONCLUSIONS:     1. Left ventricular systolic function is hyperdynamic with an ejection fraction of 76 % by Ling's method of disks.   2. Normal right ventricular cavity size and systolic function.   3. No prior echocardiogram is available for comparison.    < end of copied text >    [ ]  Catheterization:  [ ] Stress Test:      Labs:                        10.4   5.85  )-----------( 101      ( 24 Dec 2023 04:00 )             30.8     12-24    131<L>  |  94<L>  |  17.3  ----------------------------<  375<H>  4.2   |  28.0  |  1.22    Ca    8.2<L>      24 Dec 2023 04:00  Phos  2.4     12-24  Mg     1.9     12-24    TPro  5.6<L>  /  Alb  2.6<L>  /  TBili  1.0  /  DBili  0.6<H>  /  AST  46<H>  /  ALT  30  /  AlkPhos  233<H>  12-24     20 Dec 2023 18:12 Troponin x     / Creatine Kinase 173 U/L /  CKMB 2.7 ng/mL / CPK Mass Assay % x            Cholesterol --; Direct LDL --; HDL Cholesterol, Serum --; HDL/ Total Cholesterol Ratio Measurement --; Total Cholesterol/ HDL Ratio Measurement --; Triglycerides, Serum 575 mg/dL, Cholesterol --; Direct LDL --; HDL Cholesterol, Serum --; HDL/ Total Cholesterol Ratio Measurement --; Total Cholesterol/ HDL Ratio Measurement --; Triglycerides, Serum 1204 mg/dL, Cholesterol --; Direct LDL --; HDL Cholesterol, Serum --; HDL/ Total Cholesterol Ratio Measurement --; Total Cholesterol/ HDL Ratio Measurement --; Triglycerides, Serum 1045 mg/dL, Cholesterol --; Direct LDL --; HDL Cholesterol, Serum --; HDL/ Total Cholesterol Ratio Measurement --; Total Cholesterol/ HDL Ratio Measurement --; Triglycerides, Serum 1194 mg/dL, Cholesterol --; Direct LDL --; HDL Cholesterol, Serum --; HDL/ Total Cholesterol Ratio Measurement --; Total Cholesterol/ HDL Ratio Measurement --; Triglycerides, Serum 1549 mg/dL, Cholesterol --; Direct LDL --; HDL Cholesterol, Serum --; HDL/ Total Cholesterol Ratio Measurement --; Total Cholesterol/ HDL Ratio Measurement --; Triglycerides, Serum 2478 mg/dL, Cholesterol 587 mg/dL; Direct LDL --; HDL Cholesterol, Serum 4 mg/dL; HDL/ Total Cholesterol Ratio Measurement --; Total Cholesterol/ HDL Ratio Measurement --; Triglycerides, Serum >4425 mg/dL, Cholesterol 431 mg/dL; Direct LDL --; HDL Cholesterol, Serum 6 mg/dL; HDL/ Total Cholesterol Ratio Measurement --; Total Cholesterol/ HDL Ratio Measurement --; Triglycerides, Serum >4425 mg/dL  A1C with Estimated Average Glucose Result: 8.4 % (12-21-23 @ 08:00)      TELEMETRY: Sinus tach to 150s, 1st degree

## 2023-12-24 NOTE — PROGRESS NOTE ADULT - TIME BILLING
Sinus tach, Mobitz 1/2, HLD
reviewing labs, notes, orders, radiographic studies, as well as counseling and coordinating care with the relevant multidisciplinary team, including with the primary and consulting providers.

## 2023-12-24 NOTE — DISCHARGE NOTE PROVIDER - HOSPITAL COURSE
29 y/o F from [place] w/ PMH of ETOH abuse (hx of withdrawal seizrues in the past but no DTs), IDDM (has reji 3 and uses basal/bolus pen regimen), congenital heart block was BIBA after having a witnessed seizure.  Pt reports at baseline she drinks 2pints of vodka daily but tried cutting a few days prior to this event.  On arrival pt was found to have Na 118 and TG of >4000.   Na was pseudohyponatremia due to hypertriglyceridemia.  Endocrinology was consulted and pt was placed on insulin gtt and admitted to MICU.  Pt had no abd pain and lipase was 20.  w/ the insulin gtt TG have steadily improved and now are 1045.  Endo transitioned pt to basal/bolus regimen and started pt on fenofibrate and lovaza.  Pt is on an ativan taper for ETOH withdrawal.  Pt is down graded to medicine 12/23. pt seen by cardiology. pt reports following with cardio outpt and has had Holter. tachycardia suspected due to EtOH abuse hx vs inappropriate sinus tachy, as per cardio, no AV misha blocker as pt asymptomatic and underlying 1st and 2nd degree heart blocks, pt to f/u outpt. pt to dc home 27 y/o F from [place] w/ PMH of ETOH abuse (hx of withdrawal seizrues in the past but no DTs), IDDM (has reji 3 and uses basal/bolus pen regimen), congenital heart block was BIBA after having a witnessed seizure.  Pt reports at baseline she drinks 2pints of vodka daily but tried cutting a few days prior to this event.  On arrival pt was found to have Na 118 and TG of >4000.   Na was pseudohyponatremia due to hypertriglyceridemia.  Endocrinology was consulted and pt was placed on insulin gtt and admitted to MICU.  Pt had no abd pain and lipase was 20.  w/ the insulin gtt TG have steadily improved and now are 1045.  Endo transitioned pt to basal/bolus regimen and started pt on fenofibrate and lovaza.  Pt is on an ativan taper for ETOH withdrawal.  Pt is down graded to medicine 12/23. pt seen by cardiology. pt reports following with cardio outpt and has had Holter. tachycardia suspected due to EtOH abuse hx vs inappropriate sinus tachy, as per cardio, no AV misha blocker as pt asymptomatic and underlying 1st and 2nd degree heart blocks, pt to f/u outpt. pt to dc home

## 2023-12-24 NOTE — DISCHARGE NOTE PROVIDER - CARE PROVIDER_API CALL
Fidelia Romano  Endocrinology/Metab/Diabetes  [address], [address]  [place] [address]  Phone: [phone]  Fax: [phone]  Follow Up Time:     Marcial Hines  Cardiovascular Disease  [address], [address]  [place] [address]  Phone: [phone]  Fax: [phone]  Follow Up Time:     PMD,   Phone: (   )    -  Fax: (   )    -  Follow Up Time:    Fidelia Romano  Endocrinology/Metab/Diabetes  [address], [address]  [place] [address]  Phone: [phone]  Fax: [phone]  Follow Up Time:     Marcial Hines  Cardiovascular Disease  [address], [address]  [place] [address]  Phone: [phone]  Fax: [phone]  Follow Up Time:     PMD,   Phone: (   )    -  Fax: (   )    -  Follow Up Time:

## 2023-12-24 NOTE — PROGRESS NOTE ADULT - ASSESSMENT
28F history significant for DM1 (A1c 8.3), congenital heart blocks?, chronic alcohol abuse (drinks Vodka 2 pints daily) with alcohol withdrawal seizure, resides at [place] visiting family in [place], presents with witnessed seizure found with hyponatremia 118 and TG >4K with TC >500s admitted to MICU on insulin gtt and started on fenofibrate/Lovaza for severe hypertriglyceridemia, EKG with 1st degree AV block (250-300 ms) and Tele with sinus tach but also episodes of Mobitz 1 & 2 heart blocks around 5am and episodes of sinus tachycardia to 150s      -she denies prior syncope, has been following with a cardiologist in [place] with Holter done in the past  -Echo done here with preserved LV EF, euvolemic on exam, there is no evidence of HF, check baseline pBNP  -check direct LDL level, started on statin in the MICU but given reproductive age need to stop statin if contemplating pregnancy  -she denies any family history cardiac disease or familial hyperlipidemia, attempt to contact her father's phone yesterdy for collateral not able to be reached   -suspect reactive sinus tachycardia from alcohol abuse history; will suppress with AV misha blocker as she is asymptomatic and underlying 1st & 2nd degree heart blocks, continue telemetry while inpatient, follow up outpatient with her cardiologist for long term rhythm monitoring  -strongly advised alcohol cessation, detox/rehab program            Marcial Hines DO, [facility]  Faculty Non-Invasive Cardiologist  [phone].  28F history significant for DM1 (A1c 8.3), congenital heart blocks?, chronic alcohol abuse (drinks Vodka 2 pints daily) with alcohol withdrawal seizure, resides at [place] visiting family in [place], presents with witnessed seizure found with hyponatremia 118 and TG >4K with TC >500s admitted to MICU on insulin gtt and started on fenofibrate/Lovaza for severe hypertriglyceridemia, EKG with 1st degree AV block (250-300 ms) and Tele with sinus tach but also episodes of Mobitz 1 & 2 heart blocks around 5am and episodes of sinus tachycardia to 150s      -she denies prior syncope, has been following with a cardiologist in [place] with Holter done in the past  -Echo done here with preserved LV EF, euvolemic on exam, there is no evidence of HF, check baseline pBNP  -check direct LDL level, started on statin in the MICU but given reproductive age need to stop statin if contemplating pregnancy  -she denies any family history cardiac disease or familial hyperlipidemia, attempt to contact her father's phone yesterdy for collateral not able to be reached   -suspect reactive sinus tachycardia from alcohol abuse history; will suppress with AV misha blocker as she is asymptomatic and underlying 1st & 2nd degree heart blocks, continue telemetry while inpatient, follow up outpatient with her cardiologist for long term rhythm monitoring  -strongly advised alcohol cessation, detox/rehab program            Marcial Hines DO, [facility]  Faculty Non-Invasive Cardiologist  [phone].  28F history significant for DM1 (A1c 8.3), congenital heart blocks?, chronic alcohol abuse (drinks Vodka 2 pints daily) with alcohol withdrawal seizure, resides at [place] visiting family in [place], presents with witnessed seizure found with hyponatremia 118 and TG >4K with TC >500s admitted to MICU on insulin gtt and started on fenofibrate/Lovaza for severe hypertriglyceridemia, EKG with 1st degree AV block (250-300 ms) and Tele with sinus tach but also episodes of Mobitz 1 & 2 heart blocks around 5am and episodes of sinus tachycardia to 150s      -she denies prior syncope, has been following with a cardiologist in [place] with Holter done in the past  -Echo done here with preserved LV EF, euvolemic on exam, there is no evidence of HF, check baseline pBNP  -check direct LDL level, started on statin in the MICU but given reproductive age need to stop statin if contemplating pregnancy  -she denies any family history cardiac disease or familial hyperlipidemia, attempt to contact her father's phone yesterdy for collateral not able to be reached   -TSH normal, suspect reactive sinus tachycardia from alcohol abuse history vs. inappropriate sinus tach; will not suppress with AV misha blocker as she is asymptomatic and underlying 1st & 2nd degree heart blocks, continue telemetry while inpatient, follow up outpatient with her cardiologist for long term rhythm monitoring  -strongly advised alcohol cessation, detox/rehab program            Marcial Hines DO, [facility]  Faculty Non-Invasive Cardiologist  [phone].  28F history significant for DM1 (A1c 8.3), congenital heart blocks?, chronic alcohol abuse (drinks Vodka 2 pints daily) with alcohol withdrawal seizure, resides at [place] visiting family in [place], presents with witnessed seizure found with hyponatremia 118 and TG >4K with TC >500s admitted to MICU on insulin gtt and started on fenofibrate/Lovaza for severe hypertriglyceridemia, EKG with 1st degree AV block (250-300 ms) and Tele with sinus tach but also episodes of Mobitz 1 & 2 heart blocks around 5am and episodes of sinus tachycardia to 150s      -she denies prior syncope, has been following with a cardiologist in [place] with Holter done in the past  -Echo done here with preserved LV EF, euvolemic on exam, there is no evidence of HF, check baseline pBNP  -check direct LDL level, started on statin in the MICU but given reproductive age need to stop statin if contemplating pregnancy  -she denies any family history cardiac disease or familial hyperlipidemia, attempt to contact her father's phone yesterdy for collateral not able to be reached   -TSH normal, suspect reactive sinus tachycardia from alcohol abuse history vs. inappropriate sinus tach; will not suppress with AV misha blocker as she is asymptomatic and underlying 1st & 2nd degree heart blocks, continue telemetry while inpatient, follow up outpatient with her cardiologist for long term rhythm monitoring  -strongly advised alcohol cessation, detox/rehab program            Marcial Hines DO, [facility]  Faculty Non-Invasive Cardiologist  [phone].

## 2023-12-24 NOTE — DISCHARGE NOTE NURSING/CASE MANAGEMENT/SOCIAL WORK - NSDCPEFALRISK_GEN_ALL_CORE
For information on Fall & Injury Prevention, visit: [email or web address] OR  [email or web address] OR  https://www.cdc.gov/steadi/patient.html For information on Fall & Injury Prevention, visit: [email or web address] OR  [email or web address] OR  https://www.cdc.gov/steadi/patient.html

## 2024-04-02 NOTE — ED PROVIDER NOTE - NS ED ATTENDING STATEMENT MOD
Last office visit 2/28/2024     Last written      Next office visit scheduled 6/28/2024    Requested Prescriptions     Pending Prescriptions Disp Refills    LANTUS SOLOSTAR 100 UNIT/ML injection pen [Pharmacy Med Name: LANTUS SOLOSTAR PEN INJ 3ML] 15 mL 0     Sig: ADMINISTER 28 UNITS UNDER THE SKIN EVERY NIGHT              Attending Only